# Patient Record
Sex: MALE | Race: BLACK OR AFRICAN AMERICAN | NOT HISPANIC OR LATINO | Employment: FULL TIME | URBAN - METROPOLITAN AREA
[De-identification: names, ages, dates, MRNs, and addresses within clinical notes are randomized per-mention and may not be internally consistent; named-entity substitution may affect disease eponyms.]

---

## 2020-08-12 ENCOUNTER — APPOINTMENT (EMERGENCY)
Dept: RADIOLOGY | Facility: HOSPITAL | Age: 35
End: 2020-08-12

## 2020-08-12 ENCOUNTER — HOSPITAL ENCOUNTER (EMERGENCY)
Facility: HOSPITAL | Age: 35
Discharge: HOME | End: 2020-08-12
Attending: EMERGENCY MEDICINE | Admitting: EMERGENCY MEDICINE

## 2020-08-12 VITALS
RESPIRATION RATE: 18 BRPM | WEIGHT: 280 LBS | DIASTOLIC BLOOD PRESSURE: 85 MMHG | HEART RATE: 102 BPM | OXYGEN SATURATION: 98 % | TEMPERATURE: 99.6 F | SYSTOLIC BLOOD PRESSURE: 163 MMHG

## 2020-08-12 DIAGNOSIS — M25.561 ACUTE PAIN OF RIGHT KNEE: Primary | ICD-10-CM

## 2020-08-12 PROCEDURE — 99282 EMERGENCY DEPT VISIT SF MDM: CPT | Performed by: EMERGENCY MEDICINE

## 2020-08-12 PROCEDURE — 99284 EMERGENCY DEPT VISIT MOD MDM: CPT

## 2020-08-12 PROCEDURE — 73564 X-RAY EXAM KNEE 4 OR MORE: CPT
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
## 2020-08-12 NOTE — DISCHARGE INSTRUCTIONS
Follow-up with orthopedics within 1 week  Wear knee immobilizer and use crutches as needed  Rest, ice, elevate  Return to the emergency department for the following, but not limited to worsening pain, numbness, tingling, significant swelling, redness, fevers

## 2020-08-12 NOTE — ED NOTES
Pt verbally aggressive with staff  Yelling and cursing loudly  Security Chyna as well as 2 RNs at bedside attempting to de-escalate  Pt continues to be verbally aggressive and yelling that we are treating him unfairly due to the fact that he's black  Pt continues to yell at staff  Charge RN, davion Yeh RN  08/12/20 2588

## 2020-08-12 NOTE — ED NOTES
Pt speaking very loudly on the phone stating, he was still going to [facility] with his knee brace and would be walking around to "make it worse "  He also mentioned something to the effect of when he gets his "million dollars "       Nola Reynoso RN  08/12/20 0511

## 2021-04-11 ENCOUNTER — HOSPITAL ENCOUNTER (EMERGENCY)
Facility: HOSPITAL | Age: 36
Discharge: HOME | End: 2021-04-11
Attending: EMERGENCY MEDICINE | Admitting: EMERGENCY MEDICINE
Payer: COMMERCIAL

## 2021-04-11 ENCOUNTER — APPOINTMENT (EMERGENCY)
Dept: RADIOLOGY | Facility: HOSPITAL | Age: 36
End: 2021-04-11
Payer: COMMERCIAL

## 2021-04-11 VITALS
DIASTOLIC BLOOD PRESSURE: 78 MMHG | SYSTOLIC BLOOD PRESSURE: 140 MMHG | RESPIRATION RATE: 22 BRPM | OXYGEN SATURATION: 99 % | HEART RATE: 74 BPM | TEMPERATURE: 97.4 F

## 2021-04-11 DIAGNOSIS — R55 SYNCOPE: Primary | ICD-10-CM

## 2021-04-11 LAB
ANION GAP SERPL CALCULATED.3IONS-SCNC: 8 MMOL/L (ref 4–13)
BASOPHILS # BLD AUTO: 0.05 THOUSANDS/ΜL (ref 0–0.1)
BASOPHILS NFR BLD AUTO: 0 % (ref 0–1)
BUN SERPL-MCNC: 9 MG/DL (ref 5–25)
CALCIUM SERPL-MCNC: 9.1 MG/DL (ref 8.3–10.1)
CHLORIDE SERPL-SCNC: 102 MMOL/L (ref 100–108)
CO2 SERPL-SCNC: 28 MMOL/L (ref 21–32)
CREAT SERPL-MCNC: 1.28 MG/DL (ref 0.6–1.3)
EOSINOPHIL # BLD AUTO: 0.17 THOUSAND/ΜL (ref 0–0.61)
EOSINOPHIL NFR BLD AUTO: 1 % (ref 0–6)
ERYTHROCYTE [DISTWIDTH] IN BLOOD BY AUTOMATED COUNT: 13.7 % (ref 11.6–15.1)
GFR SERPL CREATININE-BSD FRML MDRD: 83 ML/MIN/1.73SQ M
GLUCOSE SERPL-MCNC: 124 MG/DL (ref 65–140)
HCT VFR BLD AUTO: 41.4 % (ref 36.5–49.3)
HGB BLD-MCNC: 12.6 G/DL (ref 12–17)
HOLD SPECIMEN: NORMAL
IMM GRANULOCYTES # BLD AUTO: 0.06 THOUSAND/UL (ref 0–0.2)
IMM GRANULOCYTES NFR BLD AUTO: 1 % (ref 0–2)
LYMPHOCYTES # BLD AUTO: 1.74 THOUSANDS/ΜL (ref 0.6–4.47)
LYMPHOCYTES NFR BLD AUTO: 15 % (ref 14–44)
MCH RBC QN AUTO: 25.9 PG (ref 26.8–34.3)
MCHC RBC AUTO-ENTMCNC: 30.4 G/DL (ref 31.4–37.4)
MCV RBC AUTO: 85 FL (ref 82–98)
MONOCYTES # BLD AUTO: 0.36 THOUSAND/ΜL (ref 0.17–1.22)
MONOCYTES NFR BLD AUTO: 3 % (ref 4–12)
NEUTROPHILS # BLD AUTO: 9.51 THOUSANDS/ΜL (ref 1.85–7.62)
NEUTS SEG NFR BLD AUTO: 80 % (ref 43–75)
NRBC BLD AUTO-RTO: 0 /100 WBCS
PLATELET # BLD AUTO: 157 THOUSANDS/UL (ref 149–390)
PMV BLD AUTO: 11.6 FL (ref 8.9–12.7)
POTASSIUM SERPL-SCNC: 3.5 MMOL/L (ref 3.5–5.3)
RBC # BLD AUTO: 4.87 MILLION/UL (ref 3.88–5.62)
SODIUM SERPL-SCNC: 138 MMOL/L (ref 136–145)
WBC # BLD AUTO: 11.89 THOUSAND/UL (ref 4.31–10.16)

## 2021-04-11 PROCEDURE — G1004 CDSM NDSC: HCPCS

## 2021-04-11 PROCEDURE — 36415 COLL VENOUS BLD VENIPUNCTURE: CPT | Performed by: PHYSICIAN ASSISTANT

## 2021-04-11 PROCEDURE — 70450 CT HEAD/BRAIN W/O DYE: CPT

## 2021-04-11 PROCEDURE — 99285 EMERGENCY DEPT VISIT HI MDM: CPT | Performed by: PHYSICIAN ASSISTANT

## 2021-04-11 PROCEDURE — 93005 ELECTROCARDIOGRAM TRACING: CPT

## 2021-04-11 PROCEDURE — 85025 COMPLETE CBC W/AUTO DIFF WBC: CPT | Performed by: PHYSICIAN ASSISTANT

## 2021-04-11 PROCEDURE — 80048 BASIC METABOLIC PNL TOTAL CA: CPT | Performed by: PHYSICIAN ASSISTANT

## 2021-04-11 PROCEDURE — 99285 EMERGENCY DEPT VISIT HI MDM: CPT

## 2021-04-11 NOTE — ED PROVIDER NOTES
History  Chief Complaint   Patient presents with    Syncope     patient was outside smoking medical marijuana when he felt lightheaded and passed out  Patient states he drank about a 6 pack and a few shots last night and woke up feeling thirsty and dehydrated  Patient currently has no complaints  The patient is a 43yo with single episode of syncope just PTA  He reports drinkin more than normal last night (a six pack and a few shots)  Noted to be smoking his medical marijuana this morning as well  This is prescribed for chronic leg pain  Felt dizzy and passed out  Did not hit his head  LOC was brief  Fells may be related to dehydration  No complaints at this time  Syncope  Associated symptoms: no palpitations, no seizures and no weakness        None       History reviewed  No pertinent past medical history  Past Surgical History:   Procedure Laterality Date    KNEE SURGERY Bilateral     SHOULDER SURGERY Left        History reviewed  No pertinent family history  I have reviewed and agree with the history as documented  E-Cigarette/Vaping    E-Cigarette Use Never User      E-Cigarette/Vaping Substances    Nicotine No     THC No     CBD No     Flavoring No     Other No     Unknown No      Social History     Tobacco Use    Smoking status: Current Some Day Smoker     Types: Cigarettes    Smokeless tobacco: Never Used   Substance Use Topics    Alcohol use: Yes     Frequency: 2-3 times a week    Drug use: Yes     Types: Marijuana     Comment: medical card       Review of Systems   Constitutional: Negative for activity change, appetite change and fatigue  HENT: Negative for nosebleeds, sneezing, sore throat, trouble swallowing and voice change  Eyes: Negative for photophobia, pain and visual disturbance  Respiratory: Negative for apnea, choking and stridor  Cardiovascular: Positive for syncope  Negative for palpitations and leg swelling     Gastrointestinal: Negative for anal bleeding and constipation  Endocrine: Negative for cold intolerance, heat intolerance, polydipsia and polyphagia  Genitourinary: Negative for decreased urine volume, enuresis, frequency, genital sores and urgency  Musculoskeletal: Negative for joint swelling and myalgias  Allergic/Immunologic: Negative for environmental allergies and food allergies  Neurological: Positive for syncope  Negative for tremors, seizures, speech difficulty and weakness  Hematological: Negative for adenopathy  Psychiatric/Behavioral: Negative for behavioral problems, decreased concentration, dysphoric mood and hallucinations  All other systems reviewed and are negative  Physical Exam  Physical Exam  Vitals signs reviewed  Constitutional:       General: He is not in acute distress  Appearance: He is well-developed  He is not diaphoretic  HENT:      Head: Normocephalic and atraumatic  Right Ear: External ear normal       Left Ear: External ear normal       Nose: Nose normal    Eyes:      Conjunctiva/sclera: Conjunctivae normal       Pupils: Pupils are equal, round, and reactive to light  Neck:      Musculoskeletal: Normal range of motion and neck supple  Cardiovascular:      Rate and Rhythm: Normal rate and regular rhythm  Heart sounds: Normal heart sounds  No murmur  No friction rub  No gallop  Pulmonary:      Effort: Pulmonary effort is normal  No respiratory distress  Breath sounds: Normal breath sounds  No wheezing  Abdominal:      General: Bowel sounds are normal       Palpations: Abdomen is soft  Musculoskeletal:         General: No tenderness  Skin:     General: Skin is warm and dry  Neurological:      Mental Status: He is alert and oriented to person, place, and time     Psychiatric:         Behavior: Behavior normal          Vital Signs  ED Triage Vitals [04/11/21 1229]   Temperature Pulse Respirations Blood Pressure SpO2   (!) 97 4 °F (36 3 °C) 94 18 163/81 100 %      Temp Source Heart Rate Source Patient Position - Orthostatic VS BP Location FiO2 (%)   Tympanic Monitor Sitting Right arm --      Pain Score       --           Vitals:    04/11/21 1430 04/11/21 1500 04/11/21 1515 04/11/21 1530   BP: 140/78      Pulse:  84 78 74   Patient Position - Orthostatic VS:             Visual Acuity  Visual Acuity      Most Recent Value   L Pupil Size (mm)  2   R Pupil Size (mm)  2          ED Medications  Medications - No data to display    Diagnostic Studies  Results Reviewed     Procedure Component Value Units Date/Time    Bronson draw [[id number]] Collected: 04/11/21 1245    Lab Status: Final result Specimen: Blood from Arm, Left Updated: 04/11/21 1401    Narrative: The following orders were created for panel order Bronson draw  Procedure                               Abnormality         Status                     ---------                               -----------         ------                     Simran Conger Top on IVBM[[id number]]                           Final result               Green / Black tube on XLZH[[id number]]                       Final result                 Please view results for these tests on the individual orders      Basic metabolic panel [[id number]] Collected: 04/11/21 1245    Lab Status: Final result Specimen: Blood from Arm, Left Updated: 04/11/21 1315     Sodium 138 mmol/L      Potassium 3 5 mmol/L      Chloride 102 mmol/L      CO2 28 mmol/L      ANION GAP 8 mmol/L      BUN 9 mg/dL      Creatinine 1 28 mg/dL      Glucose 124 mg/dL      Calcium 9 1 mg/dL      eGFR 83 ml/min/1 73sq m     Narrative:      Alex guidelines for Chronic Kidney Disease (CKD):     Stage 1 with normal or high GFR (GFR > 90 mL/min/1 73 square meters)    Stage 2 Mild CKD (GFR = 60-89 mL/min/1 73 square meters)    Stage 3A Moderate CKD (GFR = 45-59 mL/min/1 73 square meters)    Stage 3B Moderate CKD (GFR = 30-44 mL/min/1 73 square meters)    Stage 4 Severe CKD (GFR = 15-29 mL/min/1 73 square meters)    Stage 5 End Stage CKD (GFR <15 mL/min/1 73 square meters)  Note: GFR calculation is accurate only with a steady state creatinine    CBC and differential [[id number]]  (Abnormal) Collected: 04/11/21 1245    Lab Status: Final result Specimen: Blood from Arm, Left Updated: 04/11/21 1302     WBC 11 89 Thousand/uL      RBC 4 87 Million/uL      Hemoglobin 12 6 g/dL      Hematocrit 41 4 %      MCV 85 fL      MCH 25 9 pg      MCHC 30 4 g/dL      RDW 13 7 %      MPV 11 6 fL      Platelets 663 Thousands/uL      nRBC 0 /100 WBCs      Neutrophils Relative 80 %      Immat GRANS % 1 %      Lymphocytes Relative 15 %      Monocytes Relative 3 %      Eosinophils Relative 1 %      Basophils Relative 0 %      Neutrophils Absolute 9 51 Thousands/µL      Immature Grans Absolute 0 06 Thousand/uL      Lymphocytes Absolute 1 74 Thousands/µL      Monocytes Absolute 0 36 Thousand/µL      Eosinophils Absolute 0 17 Thousand/µL      Basophils Absolute 0 05 Thousands/µL                  CT head without contrast   Final Result by Addison Vasquez MD (04/11 1515)      No acute intracranial abnormality  Workstation performed: [id number]                    Procedures  ECG 12 Lead Documentation Only    Date/Time: 4/11/2021 12:53 PM  Performed by: Eldon Silveira PA-C  Authorized by: Eldon Silveira PA-C     Indications / Diagnosis:  Syncope  Patient location:  ED  Previous ECG:     Previous ECG:  Unavailable  Interpretation:     Interpretation: normal    Rate:     ECG rate:  90    ECG rate assessment: normal    Rhythm:     Rhythm: sinus rhythm    QRS:     QRS axis:  Normal  ST segments:     ST segments:  Normal  T waves:     T waves: normal               ED Course                             SBIRT 22yo+      Most Recent Value   SBIRT (24 yo +)   In order to provide better care to our patients, we are screening all of our patients for alcohol and drug use   Would it be okay to ask you these screening questions? Yes Filed at: 04/11/2021 1304   Initial Alcohol Screen: US AUDIT-C    1  How often do you have a drink containing alcohol? 3 Filed at: 04/11/2021 1304   2  How many drinks containing alcohol do you have on a typical day you are drinking? 4 Filed at: 04/11/2021 1304   3a  Male UNDER 65: How often do you have five or more drinks on one occasion? 3 Filed at: 04/11/2021 1304   3b  FEMALE Any Age, or MALE 65+: How often do you have 4 or more drinks on one occassion? 0 Filed at: 04/11/2021 1304   Audit-C Score  (!) 10 Filed at: 04/11/2021 1304   Full Alcohol Screen: US AUDIT   4  How often during the last year have you found that you were not able to stop drinking once you had started? 0 Filed at: 04/11/2021 1304   5  How often during past year have you failed to do what was normally expected of you because of drinking? 0 Filed at: 04/11/2021 1304   6  How often in past year have you needed a first drink in the morning to get yourself going after a heavy drinking session? 0 Filed at: 04/11/2021 1304   7  How often in past year have you had feeling of guilt or remorse after drinking? 0 Filed at: 04/11/2021 1304   8  How often in past year have you been unable to remember what happened night before because you had been drinking? 0 Filed at: 04/11/2021 1304   9  Have you or someone else been injured as a result of your drinking? 0 Filed at: 04/11/2021 1304   10  Has a relative, friend, doctor or other health worker been concerned about your drinking and suggested you cut down?   0 Filed at: 04/11/2021 1304   AUDIT Total Score  10 Filed at: 04/11/2021 1304   GRACIE: How many times in the past year have you    Used an illegal drug or used a prescription medication for non-medical reasons?   Never Filed at: 04/11/2021 1304                    MDM    Disposition  Final diagnoses:   Syncope     Time reflects when diagnosis was documented in both MDM as applicable and the Disposition within this note Time User Action Codes Description Comment    4/11/2021  1:29 PM Paco Liu [R55] Syncope       ED Disposition     ED Disposition Condition Date/Time Comment    Discharge Stable Sun Apr 11, 2021  3:28 PM Jaye Ross discharge to home/self care  Follow-up Information     Follow up With Specialties Details Why [address]   Schedule an appointment as soon as possible for a visit   [address] [phone]          There are no discharge medications for this patient  No discharge procedures on file      PDMP Review     None          ED Provider  Electronically Signed by           Rachael Serna PA-C  04/11/21 5589

## 2021-04-11 NOTE — ED NOTES
IV drip not flowing  Site flushed and pt advised to keep arm still while IV fluids are running       Archana Rosales RN  04/11/21 5018
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
## 2021-04-12 LAB
ATRIAL RATE: 90 BPM
P AXIS: 60 DEGREES
PR INTERVAL: 132 MS
QRS AXIS: 32 DEGREES
QRSD INTERVAL: 86 MS
QT INTERVAL: 348 MS
QTC INTERVAL: 425 MS
T WAVE AXIS: -12 DEGREES
VENTRICULAR RATE: 90 BPM

## 2021-04-12 PROCEDURE — 93010 ELECTROCARDIOGRAM REPORT: CPT | Performed by: INTERNAL MEDICINE

## 2021-04-19 ENCOUNTER — HOSPITAL ENCOUNTER (EMERGENCY)
Facility: HOSPITAL | Age: 36
Discharge: HOME | End: 2021-04-19
Attending: EMERGENCY MEDICINE
Payer: COMMERCIAL

## 2021-04-19 VITALS
OXYGEN SATURATION: 98 % | WEIGHT: 271.2 LBS | SYSTOLIC BLOOD PRESSURE: 116 MMHG | HEART RATE: 106 BPM | TEMPERATURE: 100.6 F | DIASTOLIC BLOOD PRESSURE: 72 MMHG | RESPIRATION RATE: 20 BRPM

## 2021-04-19 DIAGNOSIS — B34.9 VIRAL ILLNESS: Primary | ICD-10-CM

## 2021-04-19 PROCEDURE — U0005 INFEC AGEN DETEC AMPLI PROBE: HCPCS | Performed by: EMERGENCY MEDICINE

## 2021-04-19 PROCEDURE — 99284 EMERGENCY DEPT VISIT MOD MDM: CPT | Performed by: EMERGENCY MEDICINE

## 2021-04-19 PROCEDURE — U0003 INFECTIOUS AGENT DETECTION BY NUCLEIC ACID (DNA OR RNA); SEVERE ACUTE RESPIRATORY SYNDROME CORONAVIRUS 2 (SARS-COV-2) (CORONAVIRUS DISEASE [COVID-19]), AMPLIFIED PROBE TECHNIQUE, MAKING USE OF HIGH THROUGHPUT TECHNOLOGIES AS DESCRIBED BY CMS-2020-01-R: HCPCS | Performed by: EMERGENCY MEDICINE

## 2021-04-19 PROCEDURE — 99283 EMERGENCY DEPT VISIT LOW MDM: CPT

## 2021-04-19 RX ORDER — ACETAMINOPHEN 325 MG/1
650 TABLET ORAL ONCE
Status: COMPLETED | OUTPATIENT
Start: 2021-04-19 | End: 2021-04-19

## 2021-04-19 RX ADMIN — ACETAMINOPHEN 650 MG: 325 TABLET, FILM COATED ORAL at 20:36

## 2021-04-20 LAB — SARS-COV-2 RNA RESP QL NAA+PROBE: NEGATIVE

## 2021-04-20 NOTE — ED PROVIDER NOTES
History  Chief Complaint   Patient presents with    Fever - 9 weeks to 74 years     States he had gotten first covid vaccine last week  States not feeling well since vaccine, headache chills, bodyaches  Started with fever today  39 yo male c/o getting covid shot 6 days and started to feel ill a hours later  Developed body aches and then chills and slight cough  Today took temperature and found to have fever  No vomiting or diarrhea  History provided by:  Patient   used: No    Fever - 9 weeks to 74 years  Associated symptoms: chills, cough, myalgias and rash    Associated symptoms: no diarrhea and no vomiting        None       History reviewed  No pertinent past medical history  Past Surgical History:   Procedure Laterality Date    KNEE SURGERY Bilateral     SHOULDER SURGERY Left        History reviewed  No pertinent family history  I have reviewed and agree with the history as documented  E-Cigarette/Vaping    E-Cigarette Use Never User      E-Cigarette/Vaping Substances    Nicotine No     THC No     CBD No     Flavoring No     Other No     Unknown No      Social History     Tobacco Use    Smoking status: Current Some Day Smoker     Types: Cigarettes    Smokeless tobacco: Never Used   Substance Use Topics    Alcohol use: Yes     Frequency: 2-3 times a week    Drug use: Yes     Types: Marijuana     Comment: medical card       Review of Systems   Constitutional: Positive for chills and fever  Respiratory: Positive for cough  Gastrointestinal: Negative for diarrhea and vomiting  Musculoskeletal: Positive for myalgias  Skin: Positive for rash  Physical Exam  Physical Exam  Vitals signs and nursing note reviewed  Constitutional:       General: He is not in acute distress  Appearance: He is not ill-appearing, toxic-appearing or diaphoretic  HENT:      Head: Normocephalic and atraumatic  Neck:      Musculoskeletal: Neck supple     Cardiovascular: Rate and Rhythm: Normal rate and regular rhythm  Pulmonary:      Effort: Pulmonary effort is normal       Breath sounds: Normal breath sounds  Abdominal:      Palpations: Abdomen is soft  Musculoskeletal: Normal range of motion  Lymphadenopathy:      Cervical: No cervical adenopathy  Skin:     General: Skin is warm and dry  Neurological:      General: No focal deficit present  Mental Status: He is alert and oriented to person, place, and time  Psychiatric:         Mood and Affect: Mood normal          Behavior: Behavior normal          Vital Signs  ED Triage Vitals [04/19/21 1925]   Temperature Pulse Respirations Blood Pressure SpO2   (!) 100 6 °F (38 1 °C) (!) 106 20 116/72 98 %      Temp Source Heart Rate Source Patient Position - Orthostatic VS BP Location FiO2 (%)   Tympanic Monitor Sitting Left arm --      Pain Score       8           Vitals:    04/19/21 1925   BP: 116/72   Pulse: (!) 106   Patient Position - Orthostatic VS: Sitting         Visual Acuity      ED Medications  Medications   acetaminophen (TYLENOL) tablet 650 mg (650 mg Oral Given 4/19/21 2036)       Diagnostic Studies  Results Reviewed     Procedure Component Value Units Date/Time    [facility] [[id number] Collected: 04/19/21 2036    Lab Status: In process Specimen: Nares from Nose Updated: 04/19/21 2043                 No orders to display              Procedures  Procedures         ED Course                             SBIRT 20yo+      Most Recent Value   SBIRT (25 yo +)   In order to provide better care to our patients, we are screening all of our patients for alcohol and drug use  Would it be okay to ask you these screening questions? No Filed at: 04/19/2021 2043                    MDM  Number of Diagnoses or Management Options  Viral illness:   Diagnosis management comments: Pt   Requesting rapid covid test   I explained that we reserve the rapid tests for people who are very sick and/or being admitted or are having a procedure or surgery  I advised we would have his results tomorrow  Pt  Then demanded that I do a rapid test and I apologized and again explained the criteria  Pt  Andi Salinas he was going to report us to the [facility] and seemed angry and annoyed  Disposition  Final diagnoses:   Viral illness     Time reflects when diagnosis was documented in both MDM as applicable and the Disposition within this note     Time User Action Codes Description Comment    9/92/7689  3:37 PM Fabienne Liu [U41 3] Viral illness       ED Disposition     ED Disposition Condition Date/Time Comment    Discharge Stable Mon Apr 19, 2021  8:04 PM Lisa Kinsey discharge to home/self care  Follow-up Information     Follow up With Specialties Details Why Contact Info    your doctor              There are no discharge medications for this patient  No discharge procedures on file      PDMP Review     None          ED Provider  Electronically Signed by           Viktor Rivera MD  19/60/71 0985

## 2021-04-20 NOTE — DISCHARGE INSTRUCTIONS
Rest, fluids, over the counter tylenol, cough/cold meds as needed  Isolate  Take Vitamin D, C, multivitamin  We will call you with results tomorrow

## 2021-04-23 ENCOUNTER — APPOINTMENT (EMERGENCY)
Dept: RADIOLOGY | Facility: HOSPITAL | Age: 36
End: 2021-04-23
Payer: COMMERCIAL

## 2021-04-23 ENCOUNTER — HOSPITAL ENCOUNTER (EMERGENCY)
Facility: HOSPITAL | Age: 36
Discharge: ANOTHER HOSPITAL | End: 2021-04-24
Attending: EMERGENCY MEDICINE | Admitting: EMERGENCY MEDICINE
Payer: COMMERCIAL

## 2021-04-23 ENCOUNTER — APPOINTMENT (EMERGENCY)
Dept: RADIOLOGY | Facility: HOSPITAL | Age: 36
End: 2021-04-23
Attending: EMERGENCY MEDICINE
Payer: COMMERCIAL

## 2021-04-23 DIAGNOSIS — M79.605 LEFT LEG PAIN: ICD-10-CM

## 2021-04-23 DIAGNOSIS — I82.409 DVT (DEEP VENOUS THROMBOSIS) (HCC): Primary | ICD-10-CM

## 2021-04-23 DIAGNOSIS — R06.02 SOB (SHORTNESS OF BREATH): ICD-10-CM

## 2021-04-23 LAB
ALBUMIN SERPL BCP-MCNC: 3.3 G/DL (ref 3.5–5)
ALP SERPL-CCNC: 89 U/L (ref 46–116)
ALT SERPL W P-5'-P-CCNC: 44 U/L (ref 12–78)
ANION GAP SERPL CALCULATED.3IONS-SCNC: 9 MMOL/L (ref 4–13)
APTT PPP: 28 SECONDS (ref 23–37)
AST SERPL W P-5'-P-CCNC: 17 U/L (ref 5–45)
BACTERIA UR QL AUTO: ABNORMAL /HPF
BASOPHILS # BLD AUTO: 0.07 THOUSANDS/ΜL (ref 0–0.1)
BASOPHILS NFR BLD AUTO: 1 % (ref 0–1)
BILIRUB SERPL-MCNC: 0.4 MG/DL (ref 0.2–1)
BILIRUB UR QL STRIP: ABNORMAL
BUN SERPL-MCNC: 10 MG/DL (ref 5–25)
CALCIUM ALBUM COR SERPL-MCNC: 9.7 MG/DL (ref 8.3–10.1)
CALCIUM SERPL-MCNC: 9.1 MG/DL (ref 8.3–10.1)
CHLORIDE SERPL-SCNC: 102 MMOL/L (ref 100–108)
CLARITY UR: CLEAR
CO2 SERPL-SCNC: 28 MMOL/L (ref 21–32)
COLOR UR: ABNORMAL
CREAT SERPL-MCNC: 1.19 MG/DL (ref 0.6–1.3)
EOSINOPHIL # BLD AUTO: 0.07 THOUSAND/ΜL (ref 0–0.61)
EOSINOPHIL NFR BLD AUTO: 1 % (ref 0–6)
ERYTHROCYTE [DISTWIDTH] IN BLOOD BY AUTOMATED COUNT: 13.3 % (ref 11.6–15.1)
GFR SERPL CREATININE-BSD FRML MDRD: 90 ML/MIN/1.73SQ M
GLUCOSE SERPL-MCNC: 95 MG/DL (ref 65–140)
GLUCOSE UR STRIP-MCNC: NEGATIVE MG/DL
HCT VFR BLD AUTO: 36.2 % (ref 36.5–49.3)
HGB BLD-MCNC: 11 G/DL (ref 12–17)
HGB UR QL STRIP.AUTO: NEGATIVE
IMM GRANULOCYTES # BLD AUTO: 0.07 THOUSAND/UL (ref 0–0.2)
IMM GRANULOCYTES NFR BLD AUTO: 1 % (ref 0–2)
INR PPP: 1.23 (ref 0.84–1.19)
KETONES UR STRIP-MCNC: ABNORMAL MG/DL
LEUKOCYTE ESTERASE UR QL STRIP: NEGATIVE
LYMPHOCYTES # BLD AUTO: 1.48 THOUSANDS/ΜL (ref 0.6–4.47)
LYMPHOCYTES NFR BLD AUTO: 12 % (ref 14–44)
MCH RBC QN AUTO: 25.3 PG (ref 26.8–34.3)
MCHC RBC AUTO-ENTMCNC: 30.4 G/DL (ref 31.4–37.4)
MCV RBC AUTO: 83 FL (ref 82–98)
MONOCYTES # BLD AUTO: 0.93 THOUSAND/ΜL (ref 0.17–1.22)
MONOCYTES NFR BLD AUTO: 8 % (ref 4–12)
MUCOUS THREADS UR QL AUTO: ABNORMAL
NEUTROPHILS # BLD AUTO: 9.52 THOUSANDS/ΜL (ref 1.85–7.62)
NEUTS SEG NFR BLD AUTO: 77 % (ref 43–75)
NITRITE UR QL STRIP: NEGATIVE
NON-SQ EPI CELLS URNS QL MICRO: ABNORMAL /HPF
NRBC BLD AUTO-RTO: 0 /100 WBCS
OTHER STN SPEC: ABNORMAL
PH UR STRIP.AUTO: 5 [PH]
PLATELET # BLD AUTO: 306 THOUSANDS/UL (ref 149–390)
PMV BLD AUTO: 11 FL (ref 8.9–12.7)
POTASSIUM SERPL-SCNC: 3.8 MMOL/L (ref 3.5–5.3)
PROT SERPL-MCNC: 8.8 G/DL (ref 6.4–8.2)
PROT UR STRIP-MCNC: ABNORMAL MG/DL
PROTHROMBIN TIME: 15.3 SECONDS (ref 11.6–14.5)
RBC # BLD AUTO: 4.35 MILLION/UL (ref 3.88–5.62)
RBC #/AREA URNS AUTO: ABNORMAL /HPF
SARS-COV-2 RNA RESP QL NAA+PROBE: NEGATIVE
SODIUM SERPL-SCNC: 139 MMOL/L (ref 136–145)
SP GR UR STRIP.AUTO: <=1.005 (ref 1–1.03)
TROPONIN I SERPL-MCNC: <0.02 NG/ML
UROBILINOGEN UR QL STRIP.AUTO: 1 E.U./DL
WBC # BLD AUTO: 12.14 THOUSAND/UL (ref 4.31–10.16)
WBC #/AREA URNS AUTO: ABNORMAL /HPF

## 2021-04-23 PROCEDURE — U0005 INFEC AGEN DETEC AMPLI PROBE: HCPCS | Performed by: EMERGENCY MEDICINE

## 2021-04-23 PROCEDURE — 71045 X-RAY EXAM CHEST 1 VIEW: CPT

## 2021-04-23 PROCEDURE — 71275 CT ANGIOGRAPHY CHEST: CPT

## 2021-04-23 PROCEDURE — 81001 URINALYSIS AUTO W/SCOPE: CPT | Performed by: EMERGENCY MEDICINE

## 2021-04-23 PROCEDURE — 85730 THROMBOPLASTIN TIME PARTIAL: CPT | Performed by: EMERGENCY MEDICINE

## 2021-04-23 PROCEDURE — 99285 EMERGENCY DEPT VISIT HI MDM: CPT | Performed by: EMERGENCY MEDICINE

## 2021-04-23 PROCEDURE — 74174 CTA ABD&PLVS W/CONTRAST: CPT

## 2021-04-23 PROCEDURE — 93005 ELECTROCARDIOGRAM TRACING: CPT

## 2021-04-23 PROCEDURE — 84484 ASSAY OF TROPONIN QUANT: CPT | Performed by: EMERGENCY MEDICINE

## 2021-04-23 PROCEDURE — 85025 COMPLETE CBC W/AUTO DIFF WBC: CPT | Performed by: EMERGENCY MEDICINE

## 2021-04-23 PROCEDURE — 80053 COMPREHEN METABOLIC PANEL: CPT | Performed by: EMERGENCY MEDICINE

## 2021-04-23 PROCEDURE — 99285 EMERGENCY DEPT VISIT HI MDM: CPT

## 2021-04-23 PROCEDURE — 85610 PROTHROMBIN TIME: CPT | Performed by: EMERGENCY MEDICINE

## 2021-04-23 PROCEDURE — 96361 HYDRATE IV INFUSION ADD-ON: CPT

## 2021-04-23 PROCEDURE — 96375 TX/PRO/DX INJ NEW DRUG ADDON: CPT

## 2021-04-23 PROCEDURE — U0003 INFECTIOUS AGENT DETECTION BY NUCLEIC ACID (DNA OR RNA); SEVERE ACUTE RESPIRATORY SYNDROME CORONAVIRUS 2 (SARS-COV-2) (CORONAVIRUS DISEASE [COVID-19]), AMPLIFIED PROBE TECHNIQUE, MAKING USE OF HIGH THROUGHPUT TECHNOLOGIES AS DESCRIBED BY CMS-2020-01-R: HCPCS | Performed by: EMERGENCY MEDICINE

## 2021-04-23 PROCEDURE — 87086 URINE CULTURE/COLONY COUNT: CPT | Performed by: EMERGENCY MEDICINE

## 2021-04-23 PROCEDURE — 36415 COLL VENOUS BLD VENIPUNCTURE: CPT | Performed by: EMERGENCY MEDICINE

## 2021-04-23 RX ORDER — HYDROMORPHONE HCL/PF 1 MG/ML
1 SYRINGE (ML) INJECTION ONCE
Status: COMPLETED | OUTPATIENT
Start: 2021-04-23 | End: 2021-04-23

## 2021-04-23 RX ORDER — ACETAMINOPHEN 325 MG/1
975 TABLET ORAL ONCE
Status: COMPLETED | OUTPATIENT
Start: 2021-04-23 | End: 2021-04-23

## 2021-04-23 RX ADMIN — IOHEXOL 100 ML: 350 INJECTION, SOLUTION INTRAVENOUS at 21:20

## 2021-04-23 RX ADMIN — SODIUM CHLORIDE 1000 ML: 0.9 INJECTION, SOLUTION INTRAVENOUS at 20:18

## 2021-04-23 RX ADMIN — ACETAMINOPHEN 975 MG: 325 TABLET, FILM COATED ORAL at 20:26

## 2021-04-23 RX ADMIN — HYDROMORPHONE HYDROCHLORIDE 1 MG: 1 INJECTION, SOLUTION INTRAMUSCULAR; INTRAVENOUS; SUBCUTANEOUS at 20:27
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
## 2021-04-24 ENCOUNTER — APPOINTMENT (INPATIENT)
Dept: NON INVASIVE DIAGNOSTICS | Facility: HOSPITAL | Age: 36
DRG: 271 | End: 2021-04-24
Payer: COMMERCIAL

## 2021-04-24 ENCOUNTER — HOSPITAL ENCOUNTER (INPATIENT)
Facility: HOSPITAL | Age: 36
LOS: 6 days | Discharge: HOME | DRG: 271 | End: 2021-04-30
Attending: INTERNAL MEDICINE | Admitting: INTERNAL MEDICINE
Payer: COMMERCIAL

## 2021-04-24 VITALS
OXYGEN SATURATION: 96 % | HEART RATE: 80 BPM | TEMPERATURE: 100 F | RESPIRATION RATE: 24 BRPM | DIASTOLIC BLOOD PRESSURE: 66 MMHG | WEIGHT: 264 LBS | SYSTOLIC BLOOD PRESSURE: 112 MMHG

## 2021-04-24 DIAGNOSIS — I82.90 VENOUS THROMBOSIS: ICD-10-CM

## 2021-04-24 DIAGNOSIS — D64.9 ANEMIA: ICD-10-CM

## 2021-04-24 DIAGNOSIS — I82.402 ACUTE DEEP VEIN THROMBOSIS (DVT) OF LEFT LOWER EXTREMITY, UNSPECIFIED VEIN (HCC): Primary | ICD-10-CM

## 2021-04-24 DIAGNOSIS — R07.9 CHEST PAIN: ICD-10-CM

## 2021-04-24 PROBLEM — Z90.49 HISTORY OF PARTIAL COLECTOMY: Status: ACTIVE | Noted: 2021-04-24

## 2021-04-24 PROBLEM — N39.0 UTI (URINARY TRACT INFECTION): Status: ACTIVE | Noted: 2021-04-24

## 2021-04-24 PROBLEM — Z87.828 HISTORY OF MOTOR VEHICLE ACCIDENT: Status: ACTIVE | Noted: 2021-04-24

## 2021-04-24 LAB
ANION GAP SERPL CALCULATED.3IONS-SCNC: 5 MMOL/L (ref 4–13)
APTT PPP: 41 SECONDS (ref 23–37)
APTT PPP: 76 SECONDS (ref 23–37)
APTT PPP: 87 SECONDS (ref 23–37)
ATRIAL RATE: 101 BPM
BUN SERPL-MCNC: 9 MG/DL (ref 5–25)
CALCIUM SERPL-MCNC: 9.3 MG/DL (ref 8.3–10.1)
CHLORIDE SERPL-SCNC: 109 MMOL/L (ref 100–108)
CO2 SERPL-SCNC: 26 MMOL/L (ref 21–32)
CREAT SERPL-MCNC: 1.08 MG/DL (ref 0.6–1.3)
ERYTHROCYTE [DISTWIDTH] IN BLOOD BY AUTOMATED COUNT: 13.6 % (ref 11.6–15.1)
FERRITIN SERPL-MCNC: 239 NG/ML (ref 8–388)
GFR SERPL CREATININE-BSD FRML MDRD: 102 ML/MIN/1.73SQ M
GLUCOSE SERPL-MCNC: 88 MG/DL (ref 65–140)
HCT VFR BLD AUTO: 30.7 % (ref 36.5–49.3)
HGB BLD-MCNC: 9.5 G/DL (ref 12–17)
IRON SATN MFR SERPL: 9 %
IRON SERPL-MCNC: 20 UG/DL (ref 65–175)
MCH RBC QN AUTO: 26 PG (ref 26.8–34.3)
MCHC RBC AUTO-ENTMCNC: 30.9 G/DL (ref 31.4–37.4)
MCV RBC AUTO: 84 FL (ref 82–98)
P AXIS: 54 DEGREES
PLATELET # BLD AUTO: 272 THOUSANDS/UL (ref 149–390)
PMV BLD AUTO: 10.8 FL (ref 8.9–12.7)
POTASSIUM SERPL-SCNC: 3.7 MMOL/L (ref 3.5–5.3)
PR INTERVAL: 122 MS
QRS AXIS: 32 DEGREES
QRSD INTERVAL: 76 MS
QT INTERVAL: 310 MS
QTC INTERVAL: 401 MS
RBC # BLD AUTO: 3.66 MILLION/UL (ref 3.88–5.62)
SODIUM SERPL-SCNC: 140 MMOL/L (ref 136–145)
T WAVE AXIS: 1 DEGREES
TIBC SERPL-MCNC: 235 UG/DL (ref 250–450)
VENTRICULAR RATE: 101 BPM
WBC # BLD AUTO: 11.57 THOUSAND/UL (ref 4.31–10.16)

## 2021-04-24 PROCEDURE — 06HM33Z INSERTION OF INFUSION DEVICE INTO RIGHT FEMORAL VEIN, PERCUTANEOUS APPROACH: ICD-10-PCS | Performed by: RADIOLOGY

## 2021-04-24 PROCEDURE — 82728 ASSAY OF FERRITIN: CPT | Performed by: INTERNAL MEDICINE

## 2021-04-24 PROCEDURE — 85027 COMPLETE CBC AUTOMATED: CPT | Performed by: INTERNAL MEDICINE

## 2021-04-24 PROCEDURE — 85730 THROMBOPLASTIN TIME PARTIAL: CPT | Performed by: INTERNAL MEDICINE

## 2021-04-24 PROCEDURE — 99221 1ST HOSP IP/OBS SF/LOW 40: CPT | Performed by: INTERNAL MEDICINE

## 2021-04-24 PROCEDURE — 06CG3ZZ EXTIRPATION OF MATTER FROM LEFT EXTERNAL ILIAC VEIN, PERCUTANEOUS APPROACH: ICD-10-PCS | Performed by: RADIOLOGY

## 2021-04-24 PROCEDURE — B51C1ZZ FLUOROSCOPY OF LEFT LOWER EXTREMITY VEINS USING LOW OSMOLAR CONTRAST: ICD-10-PCS | Performed by: RADIOLOGY

## 2021-04-24 PROCEDURE — 06CN3ZZ EXTIRPATION OF MATTER FROM LEFT FEMORAL VEIN, PERCUTANEOUS APPROACH: ICD-10-PCS | Performed by: RADIOLOGY

## 2021-04-24 PROCEDURE — B5191ZZ FLUOROSCOPY OF INFERIOR VENA CAVA USING LOW OSMOLAR CONTRAST: ICD-10-PCS | Performed by: RADIOLOGY

## 2021-04-24 PROCEDURE — 93978 VASCULAR STUDY: CPT | Performed by: SURGERY

## 2021-04-24 PROCEDURE — 3E03317 INTRODUCTION OF OTHER THROMBOLYTIC INTO PERIPHERAL VEIN, PERCUTANEOUS APPROACH: ICD-10-PCS | Performed by: RADIOLOGY

## 2021-04-24 PROCEDURE — 96365 THER/PROPH/DIAG IV INF INIT: CPT

## 2021-04-24 PROCEDURE — 96376 TX/PRO/DX INJ SAME DRUG ADON: CPT

## 2021-04-24 PROCEDURE — 06HN33Z INSERTION OF INFUSION DEVICE INTO LEFT FEMORAL VEIN, PERCUTANEOUS APPROACH: ICD-10-PCS | Performed by: RADIOLOGY

## 2021-04-24 PROCEDURE — NC001 PR NO CHARGE: Performed by: SURGERY

## 2021-04-24 PROCEDURE — 99223 1ST HOSP IP/OBS HIGH 75: CPT | Performed by: SURGERY

## 2021-04-24 PROCEDURE — 80048 BASIC METABOLIC PNL TOTAL CA: CPT | Performed by: INTERNAL MEDICINE

## 2021-04-24 PROCEDURE — 06CD3ZZ EXTIRPATION OF MATTER FROM LEFT COMMON ILIAC VEIN, PERCUTANEOUS APPROACH: ICD-10-PCS | Performed by: RADIOLOGY

## 2021-04-24 PROCEDURE — 93970 EXTREMITY STUDY: CPT

## 2021-04-24 PROCEDURE — 83550 IRON BINDING TEST: CPT | Performed by: INTERNAL MEDICINE

## 2021-04-24 PROCEDURE — 96372 THER/PROPH/DIAG INJ SC/IM: CPT

## 2021-04-24 PROCEDURE — 83540 ASSAY OF IRON: CPT | Performed by: INTERNAL MEDICINE

## 2021-04-24 PROCEDURE — NC001 PR NO CHARGE: Performed by: RADIOLOGY

## 2021-04-24 PROCEDURE — 06C03ZZ EXTIRPATION OF MATTER FROM INFERIOR VENA CAVA, PERCUTANEOUS APPROACH: ICD-10-PCS | Performed by: RADIOLOGY

## 2021-04-24 PROCEDURE — 93010 ELECTROCARDIOGRAM REPORT: CPT | Performed by: INTERNAL MEDICINE

## 2021-04-24 PROCEDURE — 93970 EXTREMITY STUDY: CPT | Performed by: SURGERY

## 2021-04-24 PROCEDURE — 93978 VASCULAR STUDY: CPT

## 2021-04-24 PROCEDURE — 06CY3ZZ EXTIRPATION OF MATTER FROM LOWER VEIN, PERCUTANEOUS APPROACH: ICD-10-PCS | Performed by: RADIOLOGY

## 2021-04-24 RX ORDER — HYDROMORPHONE HCL IN WATER/PF 6 MG/30 ML
0.2 PATIENT CONTROLLED ANALGESIA SYRINGE INTRAVENOUS EVERY 6 HOURS PRN
Status: DISCONTINUED | OUTPATIENT
Start: 2021-04-24 | End: 2021-04-28

## 2021-04-24 RX ORDER — OXYCODONE HYDROCHLORIDE 5 MG/1
2.5 TABLET ORAL EVERY 6 HOURS PRN
Status: DISCONTINUED | OUTPATIENT
Start: 2021-04-24 | End: 2021-04-30 | Stop reason: HOSPADM

## 2021-04-24 RX ORDER — HEPARIN SODIUM 10000 [USP'U]/100ML
3-30 INJECTION, SOLUTION INTRAVENOUS
Status: DISCONTINUED | OUTPATIENT
Start: 2021-04-24 | End: 2021-04-26

## 2021-04-24 RX ORDER — ACETAMINOPHEN 325 MG/1
650 TABLET ORAL EVERY 6 HOURS PRN
Status: DISCONTINUED | OUTPATIENT
Start: 2021-04-24 | End: 2021-04-24

## 2021-04-24 RX ORDER — OXYCODONE HYDROCHLORIDE 5 MG/1
5 TABLET ORAL EVERY 6 HOURS PRN
Status: DISCONTINUED | OUTPATIENT
Start: 2021-04-24 | End: 2021-04-30 | Stop reason: HOSPADM

## 2021-04-24 RX ORDER — ACETAMINOPHEN 325 MG/1
650 TABLET ORAL EVERY 6 HOURS PRN
Status: DISCONTINUED | OUTPATIENT
Start: 2021-04-24 | End: 2021-04-30 | Stop reason: HOSPADM

## 2021-04-24 RX ORDER — HYDROMORPHONE HCL/PF 1 MG/ML
1 SYRINGE (ML) INJECTION ONCE
Status: COMPLETED | OUTPATIENT
Start: 2021-04-24 | End: 2021-04-24

## 2021-04-24 RX ORDER — CEFTRIAXONE 1 G/50ML
1000 INJECTION, SOLUTION INTRAVENOUS ONCE
Status: COMPLETED | OUTPATIENT
Start: 2021-04-24 | End: 2021-04-24

## 2021-04-24 RX ORDER — HEPARIN SODIUM 1000 [USP'U]/ML
4800 INJECTION, SOLUTION INTRAVENOUS; SUBCUTANEOUS
Status: DISCONTINUED | OUTPATIENT
Start: 2021-04-24 | End: 2021-04-26

## 2021-04-24 RX ORDER — HEPARIN SODIUM 1000 [USP'U]/ML
9600 INJECTION, SOLUTION INTRAVENOUS; SUBCUTANEOUS
Status: DISCONTINUED | OUTPATIENT
Start: 2021-04-24 | End: 2021-04-26

## 2021-04-24 RX ADMIN — HYDROMORPHONE HYDROCHLORIDE 0.2 MG: 0.2 INJECTION, SOLUTION INTRAMUSCULAR; INTRAVENOUS; SUBCUTANEOUS at 15:32

## 2021-04-24 RX ADMIN — ENOXAPARIN SODIUM 120 MG: 120 INJECTION SUBCUTANEOUS at 01:01

## 2021-04-24 RX ADMIN — HEPARIN SODIUM 18 UNITS/KG/HR: 10000 INJECTION, SOLUTION INTRAVENOUS at 05:37

## 2021-04-24 RX ADMIN — HYDROMORPHONE HYDROCHLORIDE 1 MG: 1 INJECTION, SOLUTION INTRAMUSCULAR; INTRAVENOUS; SUBCUTANEOUS at 01:01

## 2021-04-24 RX ADMIN — ACETAMINOPHEN 650 MG: 325 TABLET ORAL at 11:31

## 2021-04-24 RX ADMIN — ACETAMINOPHEN 650 MG: 325 TABLET ORAL at 20:01

## 2021-04-24 RX ADMIN — CEFTRIAXONE 1000 MG: 1 INJECTION, SOLUTION INTRAVENOUS at 01:01

## 2021-04-24 RX ADMIN — OXYCODONE HYDROCHLORIDE 5 MG: 5 TABLET ORAL at 20:09

## 2021-04-24 RX ADMIN — HEPARIN SODIUM 18 UNITS/KG/HR: 10000 INJECTION, SOLUTION INTRAVENOUS at 15:46

## 2021-04-24 NOTE — ASSESSMENT & PLAN NOTE
S/p total knee replacement on left and partial replacement on right and L femoral ORIF  Pt reports IVC filter was placed around the time of his surgeries in order to prevent blood clotting
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
## 2021-04-24 NOTE — PROGRESS NOTES
VASCULAR SURGERY FREE TEXT PROGRESS NOTE    Results of venous duplex reveal thrombus within ivc up to filter with extension into bilateral iliac veins    Discussed results with Dr Audrey Pearson of IR, will plan for DVT Lysis/Thrombectomy on Monday (patient does not need emergency procedure at this time)  Continue Hep Gtt, ACE Wrap, and Compression

## 2021-04-24 NOTE — ASSESSMENT & PLAN NOTE
History of partial colectomy after diverticulitis  No acute issues      Plan:  -Bowel regimen as needed

## 2021-04-24 NOTE — PLAN OF CARE
Problem: PAIN - ADULT  Goal: Verbalizes/displays adequate comfort level or baseline comfort level  Description: Interventions:  - Encourage patient to monitor pain and request assistance  - Assess pain using appropriate pain scale  - Administer analgesics based on type and severity of pain and evaluate response  - Implement non-pharmacological measures as appropriate and evaluate response  - Consider cultural and social influences on pain and pain management  - Notify physician/advanced practitioner if interventions unsuccessful or patient reports new pain  Outcome: Progressing     Problem: INFECTION - ADULT  Goal: Absence or prevention of progression during hospitalization  Description: INTERVENTIONS:  - Assess and monitor for signs and symptoms of infection  - Monitor lab/diagnostic results  - Monitor all insertion sites, i e  indwelling lines, tubes, and drains  - Monitor endotracheal if appropriate and nasal secretions for changes in amount and color  - [place] appropriate cooling/warming therapies per order  - Administer medications as ordered  - Instruct and encourage patient and family to use good hand hygiene technique  - Identify and instruct in appropriate isolation precautions for identified infection/condition  Outcome: Progressing  Goal: Absence of fever/infection during neutropenic period  Description: INTERVENTIONS:  - Monitor WBC    Outcome: Progressing     Problem: SAFETY ADULT  Goal: Patient will remain free of falls  Description: INTERVENTIONS:  - Assess patient frequently for physical needs  -  Identify cognitive and physical deficits and behaviors that affect risk of falls    -  [place] fall precautions as indicated by assessment   - Educate patient/family on patient safety including physical limitations  - Instruct patient to call for assistance with activity based on assessment  - Modify environment to reduce risk of injury  - Consider OT/PT consult to assist with strengthening/mobility  Outcome: Progressing  Goal: Maintain or return to baseline ADL function  Description: INTERVENTIONS:  -  Assess patient's ability to carry out ADLs; assess patient's baseline for ADL function and identify physical deficits which impact ability to perform ADLs (bathing, care of mouth/teeth, toileting, grooming, dressing, etc )  - Assess/evaluate cause of self-care deficits   - Assess range of motion  - Assess patient's mobility; develop plan if impaired  - Assess patient's need for assistive devices and provide as appropriate  - Encourage maximum independence but intervene and supervise when necessary  - Involve family in performance of ADLs  - Assess for home care needs following discharge   - Consider OT consult to assist with ADL evaluation and planning for discharge  - Provide patient education as appropriate  Outcome: Progressing  Goal: Maintain or return mobility status to optimal level  Description: INTERVENTIONS:  - Assess patient's baseline mobility status (ambulation, transfers, stairs, etc )    - Identify cognitive and physical deficits and behaviors that affect mobility  - Identify mobility aids required to assist with transfers and/or ambulation (gait belt, sit-to-stand, lift, walker, cane, etc )  - [place] fall precautions as indicated by assessment  - Record patient progress and toleration of activity level on Mobility SBAR; progress patient to next Phase/Stage  - Instruct patient to call for assistance with activity based on assessment  - Consider rehabilitation consult to assist with strengthening/weightbearing, etc   Outcome: Progressing     Problem: SAFETY ADULT  Goal: Maintain or return to baseline ADL function  Description: INTERVENTIONS:  -  Assess patient's ability to carry out ADLs; assess patient's baseline for ADL function and identify physical deficits which impact ability to perform ADLs (bathing, care of mouth/teeth, toileting, grooming, dressing, etc )  - Assess/evaluate cause of self-care deficits   - Assess range of motion  - Assess patient's mobility; develop plan if impaired  - Assess patient's need for assistive devices and provide as appropriate  - Encourage maximum independence but intervene and supervise when necessary  - Involve family in performance of ADLs  - Assess for home care needs following discharge   - Consider OT consult to assist with ADL evaluation and planning for discharge  - Provide patient education as appropriate  Outcome: Progressing     Problem: DISCHARGE PLANNING  Goal: Discharge to home or other facility with appropriate resources  Description: INTERVENTIONS:  - Identify barriers to discharge w/patient and caregiver  - Arrange for needed discharge resources and transportation as appropriate  - Identify discharge learning needs (meds, wound care, etc )  - Arrange for interpretive services to assist at discharge as needed  - Refer to Case Management Department for coordinating discharge planning if the patient needs post-hospital services based on physician/advanced practitioner order or complex needs related to functional status, cognitive ability, or social support system  Outcome: Progressing     Problem: Knowledge Deficit  Goal: Patient/family/caregiver demonstrates understanding of disease process, treatment plan, medications, and discharge instructions  Description: Complete learning assessment and assess knowledge base    Interventions:  - Provide teaching at level of understanding  - Provide teaching via preferred learning methods  Outcome: Progressing

## 2021-04-24 NOTE — ASSESSMENT & PLAN NOTE
CT CAP on presentation shows bladder wall thickening  Pt reports slight burning with urination, cloudy urine, with both urgency and frequency for approximately 2 weeks  Admission UA w/ 10-20  WBC, occasional bacteria, nitrite negative, started on ceftriaxone in ED    Plan:  -presented with symptomatic UTI  UA revealed leukocytes, no nitrates  Patient received 3 days of IV ceftriaxone    Urine culture negative  -patient afebrile, leukocytosis improved  -discontinue antibiotic

## 2021-04-24 NOTE — ED NOTES
PATIENT RESTING QUIETLY AT THIS TIME, DENIES PAIN OR DISCOMFORT  RESPIRATIONS EVEN AND UNLABORED  MOTHER AT BEDSIDE        Khalif Sahu RN  04/23/21 0955

## 2021-04-24 NOTE — EMTALA/ACUTE CARE TRANSFER
[address] [address]  Dept: [phone]      EMTALA TRANSFER CONSENT    NAME Chantelle Abreu                                         1985                              MRN [id number]    I have been informed of my rights regarding examination, treatment, and transfer   by Dr Jacinto Morris DO    Benefits:      Risks: Potential for delay in receiving treatment, Potential deterioration of medical condition, Loss of IV, Increased discomfort during transfer, Possible worsening of condition or death during transfer      Consent for Transfer:  I acknowledge that my medical condition has been evaluated and explained to me by the emergency department physician or other qualified medical person and/or my attending physician, who has recommended that I be transferred to the service of  Accepting Physician: Dr Celestina Escalera accepting on behalf of Dr Roderick Norton at [address] [place] [address] Name, [address] : [facility]  The above potential benefits of such transfer, the potential risks associated with such transfer, and the probable risks of not being transferred have been explained to me, and I fully understand them  The doctor has explained that, in my case, the benefits of transfer outweigh the risks  I agree to be transferred  I authorize the performance of emergency medical procedures and treatments upon me in both transit and upon arrival at the receiving facility  Additionally, I authorize the release of any and all medical records to the receiving facility and request they be transported with me, if possible  I understand that the safest mode of transportation during a medical emergency is an ambulance and that the Hospital advocates the use of this mode of transport   Risks of traveling to the receiving facility by car, including absence of medical control, life sustaining equipment, such as oxygen, and medical personnel has been explained to me and I fully understand them  (SHANE CORRECT BOX BELOW)  [  ]  I consent to the stated transfer and to be transported by ambulance/helicopter  [  ]  I consent to the stated transfer, but refuse transportation by ambulance and accept full responsibility for my transportation by car  I understand the risks of non-ambulance transfers and I exonerate the Hospital and its staff from any deterioration in my condition that results from this refusal     X___________________________________________    DATE  21  TIME________  Signature of patient or legally responsible individual signing on patient behalf           RELATIONSHIP TO PATIENT_________________________          Provider Certification    NAME Larisa Bojorquez                                        [facility] 1985                              MRN [id number]    A medical screening exam was performed on the above named patient  Based on the examination:    Condition Necessitating Transfer There were no encounter diagnoses      Patient Condition: The patient has been stabilized such that within reasonable medical probability, no material deterioration of the patient condition or the condition of the unborn child(víctor) is likely to result from the transfer    Reason for Transfer: Level of Care needed not available at this facility, Third party payor request    Transfer Requirements: Facility [facility]   · Space available and qualified personnel available for treatment as acknowledged by    · Agreed to accept transfer and to provide appropriate medical treatment as acknowledged by       Dr Alvin Metz accepting on behalf of Dr Belgica Mcfarlane  · Appropriate medical records of the examination and treatment of the patient are provided at the time of transfer   [address], [address] _______  · Transfer will be performed by qualified personnel from    and appropriate transfer equipment as required, including the use of necessary and appropriate life support measures  Provider Certification: I have examined the patient and explained the following risks and benefits of being transferred/refusing transfer to the patient/family:  General risk, such as traffic hazards, adverse weather conditions, rough terrain or turbulence, possible failure of equipment (including vehicle or aircraft), or consequences of actions of persons outside the control of the transport personnel, Unanticipated needs of medical equipment and personnel during transport, Risk of worsening condition, The possibility of a transport vehicle being unavailable      Based on these reasonable risks and benefits to the patient and/or the unborn child(víctor), and based upon the information available at the time of the patients examination, I certify that the medical benefits reasonably to be expected from the provision of appropriate medical treatments at another medical facility outweigh the increasing risks, if any, to the individuals medical condition, and in the case of labor to the unborn child, from effecting the transfer      X____________________________________________ DATE 04/24/21        TIME_______      ORIGINAL - SEND TO MEDICAL RECORDS   COPY - SEND WITH PATIENT DURING TRANSFER

## 2021-04-24 NOTE — ED NOTES
PATIENT RESTING QUIETLY AT THIS TIME, REPORTS 5/10 PAIN IN LEFT GROIN "IF I TRY TO MOVE MY LEG MUCH"  RESPIRATIONS EVEN AND UNLABORED  MOTHER AT BEDSIDE        Philip Delcid RN  04/24/21 2214
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
## 2021-04-24 NOTE — TELEMEDICINE
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

## 2021-04-24 NOTE — ED CARE HANDOFF
Emergency Department Sign Out Note        Sign out and transfer of care from Dr Olimpia Sarmiento  See Separate Emergency Department note  The patient, Dionne Song, was evaluated by the previous provider for left leg pain and shortness of breath  Workup Completed:  Blood    ED Course / Workup Pending (followup):  CTA chest/abdomen/pelvis                                  ED Course as of Apr 24 0133   Sat Apr 24, 2021   0035 Case discussed with vascular surgeon on-call, Dr Chanda Rico, who recommend Ace wrapping left lower extremity and transferring patient about [facility] for possible thrombectomy  Patient agrees with transfer plans  EMTALA completed by patient  0107 Case discussed with internal medicine resident Dr Marilyn Reis who accepts pt for transfer under attending, Dr Hu Wells  Procedures  MDM  Number of Diagnoses or Management Options  DVT (deep venous thrombosis) [facility]):   Left leg pain:   SOB (shortness of breath):      Amount and/or Complexity of Data Reviewed  Clinical lab tests: reviewed  Tests in the radiology section of CPT®: reviewed  Tests in the medicine section of CPT®: ordered and reviewed  Review and summarize past medical records: yes  Independent visualization of images, tracings, or specimens: yes    Risk of Complications, Morbidity, and/or Mortality  General comments: CTA showed the following findings:  "IVC filter appears in place  No evidence of aneurysm, or dissection  Although there is no contrast within the veins, I suspect thrombus within the distal IVC, left common iliac vein, left external iliac vein and left femoral vein  Follow-up DVT vascular   ultrasound is recommended  Diffuse soft tissue edema of the left upper thigh is consistent with suspected left leg DVT extending into the IVC "  Patient's pain was controlled in the ED with IV narcotics  CT also showed some bladder wall thickening suspecting cystitis    UA showed WBC without any leukocyte esterase or nitrates  Patient empirically given Rocephin  Case discussed with vascular surgeon on-call, Dr Kian Leblanc, who recommend Ace wrapping left lower extremity and transferring patient about [facility] for possible thrombectomy  Patient agrees with transfer plans  EMTALA completed by patient  Patient stable at time of transfer  Please Note: Fluency Direct voice recognition software may have been used in the creation of this document   Wrong words or sound a like substitutions may have occurred due to the inherent limitations of the voice software        Patient Progress  Patient progress: stable      Disposition  Final diagnoses:   DVT (deep venous thrombosis) ([facility])   Left leg pain   SOB (shortness of breath)     Time reflects when diagnosis was documented in both MDM as applicable and the Disposition within this note     Time User Action Codes Description Comment    4/24/2021  1:32 AM Seema Mae Add [I82 [id number]] DVT (deep venous thrombosis) ([address] )     4/24/2021  1:33 AM Jasmina Junior Add [M79 [id number]] Left leg pain     4/24/2021  1:33 AM Seema Mae Add [R06 [id number]] SOB (shortness of breath)       ED Disposition     ED Disposition Condition Date/Time Comment    Transfer to Another Facility-In Network  Sat Apr 24, 2021  1:08 AM Eliz Forte should be transferred out to [facility]        MD Documentation      Most Recent Value   Patient Condition  The patient has been stabilized such that within reasonable medical probability, no material deterioration of the patient condition or the condition of the unborn child(víctor) is likely to result from the transfer   Reason for Transfer  Level of Care needed not available at this facility, Third party payor request   Risks of Transfer  Potential for delay in receiving treatment, Potential deterioration of medical condition, Loss of IV, Increased discomfort during transfer, Possible worsening of condition or death during transfer   Accepting Physician  Dr Cindy Doe accepting on behalf of Dr Dany Chowdary Name, Bard Chu   Sending MD Rudolph Gilman DO   Provider Certification  General risk, such as traffic hazards, adverse weather conditions, rough terrain or turbulence, possible failure of equipment (including vehicle or aircraft), or consequences of actions of persons outside the control of the transport personnel, Unanticipated needs of medical equipment and personnel during transport, Risk of worsening condition, The possibility of a transport vehicle being unavailable      RN Documentation      [address] Name, [address]   SLB      Follow-up Information    None       Patient's Medications    No medications on file     No discharge procedures on file         ED Provider  Electronically Signed by     Rudolph Gilman DO  04/24/21 0132
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
## 2021-04-25 PROBLEM — I82.90 VENOUS THROMBOSIS: Status: ACTIVE | Noted: 2021-04-24

## 2021-04-25 PROBLEM — D64.9 ANEMIA: Status: ACTIVE | Noted: 2021-04-25

## 2021-04-25 LAB
ANION GAP SERPL CALCULATED.3IONS-SCNC: 5 MMOL/L (ref 4–13)
APTT PPP: 90 SECONDS (ref 23–37)
BACTERIA UR CULT: NORMAL
BUN SERPL-MCNC: 10 MG/DL (ref 5–25)
CALCIUM SERPL-MCNC: 9.2 MG/DL (ref 8.3–10.1)
CHLORIDE SERPL-SCNC: 107 MMOL/L (ref 100–108)
CO2 SERPL-SCNC: 26 MMOL/L (ref 21–32)
CREAT SERPL-MCNC: 0.97 MG/DL (ref 0.6–1.3)
ERYTHROCYTE [DISTWIDTH] IN BLOOD BY AUTOMATED COUNT: 13.7 % (ref 11.6–15.1)
GFR SERPL CREATININE-BSD FRML MDRD: 116 ML/MIN/1.73SQ M
GLUCOSE SERPL-MCNC: 95 MG/DL (ref 65–140)
HCT VFR BLD AUTO: 32.1 % (ref 36.5–49.3)
HGB BLD-MCNC: 9.6 G/DL (ref 12–17)
MCH RBC QN AUTO: 25.4 PG (ref 26.8–34.3)
MCHC RBC AUTO-ENTMCNC: 29.9 G/DL (ref 31.4–37.4)
MCV RBC AUTO: 85 FL (ref 82–98)
PLATELET # BLD AUTO: 305 THOUSANDS/UL (ref 149–390)
PMV BLD AUTO: 11.2 FL (ref 8.9–12.7)
POTASSIUM SERPL-SCNC: 3.8 MMOL/L (ref 3.5–5.3)
RBC # BLD AUTO: 3.78 MILLION/UL (ref 3.88–5.62)
SODIUM SERPL-SCNC: 138 MMOL/L (ref 136–145)
WBC # BLD AUTO: 10.35 THOUSAND/UL (ref 4.31–10.16)

## 2021-04-25 PROCEDURE — NC001 PR NO CHARGE: Performed by: SURGERY

## 2021-04-25 PROCEDURE — 99232 SBSQ HOSP IP/OBS MODERATE 35: CPT | Performed by: INTERNAL MEDICINE

## 2021-04-25 PROCEDURE — 85730 THROMBOPLASTIN TIME PARTIAL: CPT | Performed by: INTERNAL MEDICINE

## 2021-04-25 PROCEDURE — 80048 BASIC METABOLIC PNL TOTAL CA: CPT | Performed by: STUDENT IN AN ORGANIZED HEALTH CARE EDUCATION/TRAINING PROGRAM

## 2021-04-25 PROCEDURE — 85027 COMPLETE CBC AUTOMATED: CPT | Performed by: STUDENT IN AN ORGANIZED HEALTH CARE EDUCATION/TRAINING PROGRAM

## 2021-04-25 RX ADMIN — HYDROMORPHONE HYDROCHLORIDE 0.2 MG: 0.2 INJECTION, SOLUTION INTRAMUSCULAR; INTRAVENOUS; SUBCUTANEOUS at 21:10

## 2021-04-25 RX ADMIN — HYDROMORPHONE HYDROCHLORIDE 0.2 MG: 0.2 INJECTION, SOLUTION INTRAMUSCULAR; INTRAVENOUS; SUBCUTANEOUS at 12:26

## 2021-04-25 RX ADMIN — OXYCODONE HYDROCHLORIDE 5 MG: 5 TABLET ORAL at 09:23

## 2021-04-25 RX ADMIN — CEFTRIAXONE 1000 MG: 1 INJECTION, POWDER, FOR SOLUTION INTRAMUSCULAR; INTRAVENOUS at 00:10

## 2021-04-25 RX ADMIN — CEFTRIAXONE 1000 MG: 1 INJECTION, POWDER, FOR SOLUTION INTRAMUSCULAR; INTRAVENOUS at 23:20

## 2021-04-25 RX ADMIN — HEPARIN SODIUM 18 UNITS/KG/HR: 10000 INJECTION, SOLUTION INTRAVENOUS at 12:18

## 2021-04-25 RX ADMIN — HEPARIN SODIUM 18 UNITS/KG/HR: 10000 INJECTION, SOLUTION INTRAVENOUS at 01:18

## 2021-04-25 RX ADMIN — OXYCODONE HYDROCHLORIDE 5 MG: 5 TABLET ORAL at 23:17

## 2021-04-25 RX ADMIN — OXYCODONE HYDROCHLORIDE 5 MG: 5 TABLET ORAL at 02:22

## 2021-04-25 RX ADMIN — OXYCODONE HYDROCHLORIDE 5 MG: 5 TABLET ORAL at 16:55

## 2021-04-25 NOTE — ASSESSMENT & PLAN NOTE
Hgb 12 6 on admission, per chart review persistent anemia w/ baseline 8-9     Plan:   -Hgb 8 3 stable   -no signs of external/internal bleeding clinically   -continue to monitor h/h    -if stable tomorrow, can dc w/ outpatient GI f/u and likely need for scope  -Iron panel 4/24 : Fe 20, [place] 239, Sat 9, TIBC 235    -s/p 300 mg venofer, to be discharged on PO iron
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
## 2021-04-26 ENCOUNTER — ANESTHESIA (INPATIENT)
Dept: RADIOLOGY | Facility: HOSPITAL | Age: 36
DRG: 271 | End: 2021-04-26
Payer: COMMERCIAL

## 2021-04-26 ENCOUNTER — APPOINTMENT (INPATIENT)
Dept: RADIOLOGY | Facility: HOSPITAL | Age: 36
DRG: 271 | End: 2021-04-26
Attending: RADIOLOGY
Payer: COMMERCIAL

## 2021-04-26 ENCOUNTER — APPOINTMENT (INPATIENT)
Dept: RADIOLOGY | Facility: HOSPITAL | Age: 36
DRG: 271 | End: 2021-04-26
Payer: COMMERCIAL

## 2021-04-26 ENCOUNTER — ANESTHESIA EVENT (INPATIENT)
Dept: RADIOLOGY | Facility: HOSPITAL | Age: 36
DRG: 271 | End: 2021-04-26
Payer: COMMERCIAL

## 2021-04-26 LAB
ABO GROUP BLD: NORMAL
ABO GROUP BLD: NORMAL
APTT PPP: 117 SECONDS (ref 23–37)
BLD GP AB SCN SERPL QL: NEGATIVE
ERYTHROCYTE [DISTWIDTH] IN BLOOD BY AUTOMATED COUNT: 13.8 % (ref 11.6–15.1)
FIBRINOGEN PPP-MCNC: 685 MG/DL (ref 227–495)
FOLATE SERPL-MCNC: 10 NG/ML (ref 3.1–17.5)
HCT VFR BLD AUTO: 31.9 % (ref 36.5–49.3)
HGB BLD-MCNC: 9.6 G/DL (ref 12–17)
HGB RETIC QN AUTO: 25.6 PG (ref 30–38.3)
IMM RETICS NFR: 8.5 % (ref 0–14)
MCH RBC QN AUTO: 25.1 PG (ref 26.8–34.3)
MCHC RBC AUTO-ENTMCNC: 30.1 G/DL (ref 31.4–37.4)
MCV RBC AUTO: 84 FL (ref 82–98)
PLATELET # BLD AUTO: 340 THOUSANDS/UL (ref 149–390)
PMV BLD AUTO: 11.6 FL (ref 8.9–12.7)
RBC # BLD AUTO: 3.82 MILLION/UL (ref 3.88–5.62)
RETICS # AUTO: ABNORMAL 10*3/UL (ref 14356–105094)
RETICS # CALC: 1 % (ref 0.37–1.87)
RH BLD: POSITIVE
RH BLD: POSITIVE
SPECIMEN EXPIRATION DATE: NORMAL
VIT B12 SERPL-MCNC: 553 PG/ML (ref 100–900)
WBC # BLD AUTO: 10.62 THOUSAND/UL (ref 4.31–10.16)

## 2021-04-26 PROCEDURE — 82746 ASSAY OF FOLIC ACID SERUM: CPT | Performed by: STUDENT IN AN ORGANIZED HEALTH CARE EDUCATION/TRAINING PROGRAM

## 2021-04-26 PROCEDURE — C1887 CATHETER, GUIDING: HCPCS

## 2021-04-26 PROCEDURE — C1769 GUIDE WIRE: HCPCS

## 2021-04-26 PROCEDURE — C1751 CATH, INF, PER/CENT/MIDLINE: HCPCS

## 2021-04-26 PROCEDURE — 85730 THROMBOPLASTIN TIME PARTIAL: CPT | Performed by: SURGERY

## 2021-04-26 PROCEDURE — 85347 COAGULATION TIME ACTIVATED: CPT

## 2021-04-26 PROCEDURE — 82607 VITAMIN B-12: CPT | Performed by: STUDENT IN AN ORGANIZED HEALTH CARE EDUCATION/TRAINING PROGRAM

## 2021-04-26 PROCEDURE — 85027 COMPLETE CBC AUTOMATED: CPT | Performed by: STUDENT IN AN ORGANIZED HEALTH CARE EDUCATION/TRAINING PROGRAM

## 2021-04-26 PROCEDURE — G1004 CDSM NDSC: HCPCS

## 2021-04-26 PROCEDURE — 37212 THROMBOLYTIC VENOUS THERAPY: CPT | Performed by: RADIOLOGY

## 2021-04-26 PROCEDURE — 85384 FIBRINOGEN ACTIVITY: CPT | Performed by: SURGERY

## 2021-04-26 PROCEDURE — 86850 RBC ANTIBODY SCREEN: CPT | Performed by: HOSPITALIST

## 2021-04-26 PROCEDURE — 99291 CRITICAL CARE FIRST HOUR: CPT | Performed by: SURGERY

## 2021-04-26 PROCEDURE — 85730 THROMBOPLASTIN TIME PARTIAL: CPT | Performed by: INTERNAL MEDICINE

## 2021-04-26 PROCEDURE — 85027 COMPLETE CBC AUTOMATED: CPT | Performed by: SURGERY

## 2021-04-26 PROCEDURE — 76937 US GUIDE VASCULAR ACCESS: CPT

## 2021-04-26 PROCEDURE — 85384 FIBRINOGEN ACTIVITY: CPT | Performed by: HOSPITALIST

## 2021-04-26 PROCEDURE — 70450 CT HEAD/BRAIN W/O DYE: CPT

## 2021-04-26 PROCEDURE — 85046 RETICYTE/HGB CONCENTRATE: CPT | Performed by: STUDENT IN AN ORGANIZED HEALTH CARE EDUCATION/TRAINING PROGRAM

## 2021-04-26 PROCEDURE — 37188 VEN MECHNL THRMBC REPEAT TX: CPT

## 2021-04-26 PROCEDURE — C1894 INTRO/SHEATH, NON-LASER: HCPCS

## 2021-04-26 PROCEDURE — 37187 VENOUS MECH THROMBECTOMY: CPT | Performed by: RADIOLOGY

## 2021-04-26 PROCEDURE — 37187 VENOUS MECH THROMBECTOMY: CPT

## 2021-04-26 PROCEDURE — 75825 VEIN X-RAY TRUNK: CPT

## 2021-04-26 PROCEDURE — 36012 PLACE CATHETER IN VEIN: CPT | Performed by: RADIOLOGY

## 2021-04-26 PROCEDURE — 76937 US GUIDE VASCULAR ACCESS: CPT | Performed by: RADIOLOGY

## 2021-04-26 PROCEDURE — 86901 BLOOD TYPING SEROLOGIC RH(D): CPT | Performed by: HOSPITALIST

## 2021-04-26 PROCEDURE — 36005 INJECTION EXT VENOGRAPHY: CPT | Performed by: RADIOLOGY

## 2021-04-26 PROCEDURE — 86900 BLOOD TYPING SEROLOGIC ABO: CPT | Performed by: HOSPITALIST

## 2021-04-26 PROCEDURE — 99232 SBSQ HOSP IP/OBS MODERATE 35: CPT | Performed by: HOSPITALIST

## 2021-04-26 RX ORDER — SUCCINYLCHOLINE/SOD CL,ISO/PF 100 MG/5ML
SYRINGE (ML) INTRAVENOUS AS NEEDED
Status: DISCONTINUED | OUTPATIENT
Start: 2021-04-26 | End: 2021-04-26

## 2021-04-26 RX ORDER — HEPARIN SODIUM 200 [USP'U]/100ML
20 INJECTION, SOLUTION INTRAVENOUS CONTINUOUS
Status: DISCONTINUED | OUTPATIENT
Start: 2021-04-26 | End: 2021-04-27

## 2021-04-26 RX ORDER — ONDANSETRON 2 MG/ML
4 INJECTION INTRAMUSCULAR; INTRAVENOUS ONCE AS NEEDED
Status: DISCONTINUED | OUTPATIENT
Start: 2021-04-26 | End: 2021-04-26 | Stop reason: HOSPADM

## 2021-04-26 RX ORDER — PROPOFOL 10 MG/ML
INJECTION, EMULSION INTRAVENOUS AS NEEDED
Status: DISCONTINUED | OUTPATIENT
Start: 2021-04-26 | End: 2021-04-26

## 2021-04-26 RX ORDER — HEPARIN SODIUM 1000 [USP'U]/ML
INJECTION, SOLUTION INTRAVENOUS; SUBCUTANEOUS AS NEEDED
Status: DISCONTINUED | OUTPATIENT
Start: 2021-04-26 | End: 2021-04-26

## 2021-04-26 RX ORDER — LIDOCAINE HYDROCHLORIDE 10 MG/ML
INJECTION, SOLUTION EPIDURAL; INFILTRATION; INTRACAUDAL; PERINEURAL AS NEEDED
Status: DISCONTINUED | OUTPATIENT
Start: 2021-04-26 | End: 2021-04-26

## 2021-04-26 RX ORDER — HEPARIN SODIUM 200 [USP'U]/100ML
20 INJECTION, SOLUTION INTRAVENOUS CONTINUOUS
Status: DISCONTINUED | OUTPATIENT
Start: 2021-04-26 | End: 2021-04-26

## 2021-04-26 RX ORDER — HEPARIN SODIUM 200 [USP'U]/100ML
20 INJECTION, SOLUTION INTRAVENOUS CONTINUOUS
Status: DISCONTINUED | OUTPATIENT
Start: 2021-04-26 | End: 2021-04-26 | Stop reason: SDUPTHER

## 2021-04-26 RX ORDER — MIDAZOLAM HYDROCHLORIDE 2 MG/2ML
INJECTION, SOLUTION INTRAMUSCULAR; INTRAVENOUS AS NEEDED
Status: DISCONTINUED | OUTPATIENT
Start: 2021-04-26 | End: 2021-04-26

## 2021-04-26 RX ORDER — SODIUM CHLORIDE 9 MG/ML
INJECTION, SOLUTION INTRAVENOUS CONTINUOUS PRN
Status: DISCONTINUED | OUTPATIENT
Start: 2021-04-26 | End: 2021-04-26

## 2021-04-26 RX ORDER — HEPARIN SODIUM 10000 [USP'U]/100ML
800 INJECTION, SOLUTION INTRAVENOUS
Status: DISCONTINUED | OUTPATIENT
Start: 2021-04-26 | End: 2021-04-27

## 2021-04-26 RX ORDER — ONDANSETRON 2 MG/ML
INJECTION INTRAMUSCULAR; INTRAVENOUS AS NEEDED
Status: DISCONTINUED | OUTPATIENT
Start: 2021-04-26 | End: 2021-04-26

## 2021-04-26 RX ORDER — FENTANYL CITRATE/PF 50 MCG/ML
25 SYRINGE (ML) INJECTION
Status: COMPLETED | OUTPATIENT
Start: 2021-04-26 | End: 2021-04-26

## 2021-04-26 RX ORDER — DEXMEDETOMIDINE HYDROCHLORIDE 100 UG/ML
INJECTION, SOLUTION INTRAVENOUS AS NEEDED
Status: DISCONTINUED | OUTPATIENT
Start: 2021-04-26 | End: 2021-04-26

## 2021-04-26 RX ORDER — HYDROMORPHONE HCL IN WATER/PF 6 MG/30 ML
0.2 PATIENT CONTROLLED ANALGESIA SYRINGE INTRAVENOUS
Status: DISCONTINUED | OUTPATIENT
Start: 2021-04-26 | End: 2021-04-26 | Stop reason: HOSPADM

## 2021-04-26 RX ORDER — FENTANYL CITRATE 50 UG/ML
INJECTION, SOLUTION INTRAMUSCULAR; INTRAVENOUS AS NEEDED
Status: DISCONTINUED | OUTPATIENT
Start: 2021-04-26 | End: 2021-04-26

## 2021-04-26 RX ORDER — DEXAMETHASONE SODIUM PHOSPHATE 10 MG/ML
INJECTION, SOLUTION INTRAMUSCULAR; INTRAVENOUS AS NEEDED
Status: DISCONTINUED | OUTPATIENT
Start: 2021-04-26 | End: 2021-04-26

## 2021-04-26 RX ADMIN — FENTANYL CITRATE 100 MCG: 50 INJECTION INTRAMUSCULAR; INTRAVENOUS at 14:51

## 2021-04-26 RX ADMIN — SODIUM CHLORIDE: 0.9 INJECTION, SOLUTION INTRAVENOUS at 14:47

## 2021-04-26 RX ADMIN — ONDANSETRON 4 MG: 2 INJECTION INTRAMUSCULAR; INTRAVENOUS at 15:23

## 2021-04-26 RX ADMIN — FENTANYL CITRATE 50 MCG: 50 INJECTION INTRAMUSCULAR; INTRAVENOUS at 16:27

## 2021-04-26 RX ADMIN — ALTEPLASE 0.5 MG/HR: 2.2 INJECTION, POWDER, LYOPHILIZED, FOR SOLUTION INTRAVENOUS at 18:15

## 2021-04-26 RX ADMIN — PROPOFOL 50 MG: 10 INJECTION, EMULSION INTRAVENOUS at 17:56

## 2021-04-26 RX ADMIN — HEPARIN SODIUM 18 UNITS/KG/HR: 10000 INJECTION, SOLUTION INTRAVENOUS at 00:00

## 2021-04-26 RX ADMIN — HYDROMORPHONE HYDROCHLORIDE 0.2 MG: 0.2 INJECTION, SOLUTION INTRAMUSCULAR; INTRAVENOUS; SUBCUTANEOUS at 19:22

## 2021-04-26 RX ADMIN — LIDOCAINE HYDROCHLORIDE 50 MG: 10 INJECTION, SOLUTION EPIDURAL; INFILTRATION; INTRACAUDAL; PERINEURAL at 14:51

## 2021-04-26 RX ADMIN — MIDAZOLAM 2 MG: 1 INJECTION INTRAMUSCULAR; INTRAVENOUS at 14:46

## 2021-04-26 RX ADMIN — Medication 25 MCG: at 19:07

## 2021-04-26 RX ADMIN — PROPOFOL 20 MG: 10 INJECTION, EMULSION INTRAVENOUS at 18:02

## 2021-04-26 RX ADMIN — ALTEPLASE 8 MG: 2.2 INJECTION, POWDER, LYOPHILIZED, FOR SOLUTION INTRAVENOUS at 15:59

## 2021-04-26 RX ADMIN — DEXMEDETOMIDINE HCL 8 MCG: 100 INJECTION INTRAVENOUS at 18:05

## 2021-04-26 RX ADMIN — OXYCODONE HYDROCHLORIDE 5 MG: 5 TABLET ORAL at 20:34

## 2021-04-26 RX ADMIN — Medication 25 MCG: at 18:50

## 2021-04-26 RX ADMIN — HEPARIN SODIUM IN SODIUM CHLORIDE 20 UNITS/HR: 200 INJECTION INTRAVENOUS at 18:15

## 2021-04-26 RX ADMIN — IOHEXOL 45 ML: 350 INJECTION, SOLUTION INTRAVENOUS at 17:53

## 2021-04-26 RX ADMIN — PHENYLEPHRINE HYDROCHLORIDE 100 MCG: 10 INJECTION INTRAVENOUS at 15:52

## 2021-04-26 RX ADMIN — Medication 25 MCG: at 19:17

## 2021-04-26 RX ADMIN — Medication 25 MCG: at 18:45

## 2021-04-26 RX ADMIN — HYDROMORPHONE HYDROCHLORIDE 0.2 MG: 0.2 INJECTION, SOLUTION INTRAMUSCULAR; INTRAVENOUS; SUBCUTANEOUS at 21:28

## 2021-04-26 RX ADMIN — PROPOFOL 200 MG: 10 INJECTION, EMULSION INTRAVENOUS at 14:51

## 2021-04-26 RX ADMIN — HEPARIN SODIUM 15 UNITS/KG/HR: 10000 INJECTION, SOLUTION INTRAVENOUS at 13:59

## 2021-04-26 RX ADMIN — HEPARIN SODIUM 5000 UNITS: 1000 INJECTION INTRAVENOUS; SUBCUTANEOUS at 16:29

## 2021-04-26 RX ADMIN — HEPARIN SODIUM 3000 UNITS: 1000 INJECTION INTRAVENOUS; SUBCUTANEOUS at 17:07

## 2021-04-26 RX ADMIN — FENTANYL CITRATE 50 MCG: 50 INJECTION INTRAMUSCULAR; INTRAVENOUS at 17:37

## 2021-04-26 RX ADMIN — Medication 100 MG: at 14:52

## 2021-04-26 RX ADMIN — DEXMEDETOMIDINE HCL 8 MCG: 100 INJECTION INTRAVENOUS at 17:48

## 2021-04-26 RX ADMIN — DEXAMETHASONE SODIUM PHOSPHATE 10 MG: 10 INJECTION, SOLUTION INTRAMUSCULAR; INTRAVENOUS at 15:17

## 2021-04-26 NOTE — ANESTHESIA PREPROCEDURE EVALUATION
Procedure:  IR DVT THROMBOLYSIS/THROMBECTOMY ILIAC/IVC WITH VENOGRAM    Relevant Problems   HEMATOLOGY   (+) Anemia      hgb 9 6, cr 0 97       Anesthesia Plan  ASA Score- 3     Anesthesia Type- general with ASA Monitors  Additional Monitors:   Airway Plan: ETT  Comment: General anesthesia, endotracheal tube; standard ASA monitors  Risks and benefits discussed with patient; patient consented and agrees to proceed  I saw and evaluated the patient  If seen with CRNA, we have discussed the anesthetic plan and I am in agreement that the plan is appropriate for the patient  prone  Plan Factors-    Induction- intravenous  Postoperative Plan- Plan for postoperative opioid use  Planned trial extubation    Informed Consent- Anesthetic plan and risks discussed with patient  I personally reviewed this patient with the CRNA  Discussed and agreed on the Anesthesia Plan with the CRNA  Shreyas Olmstead

## 2021-04-26 NOTE — PLAN OF CARE
Problem: PAIN - ADULT  Goal: Verbalizes/displays adequate comfort level or baseline comfort level  Description: Interventions:  - Encourage patient to monitor pain and request assistance  - Assess pain using appropriate pain scale  - Administer analgesics based on type and severity of pain and evaluate response  - Implement non-pharmacological measures as appropriate and evaluate response  - Consider cultural and social influences on pain and pain management  - Notify physician/advanced practitioner if interventions unsuccessful or patient reports new pain  Outcome: Progressing     Problem: INFECTION - ADULT  Goal: Absence or prevention of progression during hospitalization  Description: INTERVENTIONS:  - Assess and monitor for signs and symptoms of infection  - Monitor lab/diagnostic results  - Monitor all insertion sites, i e  indwelling lines, tubes, and drains  - Monitor endotracheal if appropriate and nasal secretions for changes in amount and color  - [place] appropriate cooling/warming therapies per order  - Administer medications as ordered  - Instruct and encourage patient and family to use good hand hygiene technique  - Identify and instruct in appropriate isolation precautions for identified infection/condition  Outcome: Progressing     Problem: SAFETY ADULT  Goal: Patient will remain free of falls  Description: INTERVENTIONS:  - Assess patient frequently for physical needs  -  Identify cognitive and physical deficits and behaviors that affect risk of falls    -  [place] fall precautions as indicated by assessment   - Educate patient/family on patient safety including physical limitations  - Instruct patient to call for assistance with activity based on assessment  - Modify environment to reduce risk of injury  - Consider OT/PT consult to assist with strengthening/mobility  Outcome: Progressing     Problem: Knowledge Deficit  Goal: Patient/family/caregiver demonstrates understanding of disease process, treatment plan, medications, and discharge instructions  Description: Complete learning assessment and assess knowledge base  Interventions:  - Provide teaching at level of understanding  - Provide teaching via preferred learning methods  Outcome: Progressing     Problem: Potential for Falls  Goal: Patient will remain free of falls  Description: INTERVENTIONS:  - Assess patient frequently for physical needs  -  Identify cognitive and physical deficits and behaviors that affect risk of falls    -  [place] fall precautions as indicated by assessment   - Educate patient/family on patient safety including physical limitations  - Instruct patient to call for assistance with activity based on assessment  - Modify environment to reduce risk of injury  - Consider OT/PT consult to assist with strengthening/mobility  Outcome: Progressing
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
## 2021-04-26 NOTE — BRIEF OP NOTE (RAD/CATH)
INTERVENTIONAL RADIOLOGY PROCEDURE NOTE    Date: 4/26/2021    Procedure: IR DVT THROMBOLYSIS/THROMBECTOMY ILIAC/IVC WITH VENOGRAM    Preoperative diagnosis:   1  Acute deep vein thrombosis (DVT) of left lower extremity, unspecified vein (HCC)    2  Iliocaval venous thrombosis         Postoperative diagnosis: Same  Surgeon: Mariely Tay MD     Assistant: None  No qualified resident was available  Blood loss:  500 cc    Specimens:  None     Findings:  Ileal caval thrombosis and left leg DVT  Mechanical thrombectomy performed without flow restoration  Bilateral popliteal access for bilateral tPA infusion  Both infusion catheters started at 0 5 mg tPA each for a total dose of 1 mg an hour  Sub therapeutic heparin at 800 units in our to be titrated for PTT 40-60  Lysis recheck tomorrow probably in afternoon    Complications: None immediate      Anesthesia: general

## 2021-04-26 NOTE — CASE MANAGEMENT
LOS:  2 days  Bundle: no  Readmission: no    Explained role of CM to pt  CM obtained the following information from pt  Home: lives in a house that has 2 levels and 1 flight of stairs in between  [address]  Lives with: Significant other and 5 children   DME: denies  Ambulation: independent  Drives: yes  Ph[address]acy:  [address]  PCP: Zacarias Mcmullen History: denies inpatient psychiatric stay ever  Substance Use/Abuse History:  denies  [facility] History: denies  Work/Retired:  employed  Rehab History: denies  POA/LW: no and declined information on POA/LW  Transport at [facility]:  Family     CM reviewed d/c planning process including the following: identifying help at home, patient preference for d/c planning needs,  [facility] Meds to Bed program     Cm asked pt if he was unable to make decisions for himself who would he want to make decisions for him   He declined to chose anyone  Cm asked pt if he would like CM to call anyone he declined
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
## 2021-04-26 NOTE — DISCHARGE INSTRUCTIONS
Pelvic venogram     What is a venogram?  A venogram is an x-ray test that involves injecting contrast material into a vein to shows how blood flows through your veins  This allows a physician to determine the condition of your veins    What are some common uses of the procedure? A venogram is commonly used to:   assess the status of a vein or system of veins    find blood clots within the veins    assess varicose veins before surgery    find a vein in good condition to use for a bypass procedure or dialysis access    help a physician place an IV or a medical device, such as a stent, in a vein    guide treatment of diseased veins  How is the procedure performed? This examination is usually done on an outpatient basis  A venogram is done in a hospital x-ray department  A venogram is performed in the x-ray department or in an interventional radiology suite, sometimes called special procedures suite  You will lie on an x-ray table  Depending on the body part being examined (e g , the legs), the table may be situated to a standing position  If the table is repositioned during the procedure, you will be secured with safety straps  The physician will insert a needle or catheter into a vein to inject the contrast agent  Where that needle is placed depends upon the area of your body where the veins are being evaluated  As the contrast material flows through the veins being examined, several x-rays are taken  You may be moved into different positions so that the x-rays can take pictures of your veins at different angles  What will I experience during and after the procedure?   You will be asked to remove some of your clothes and to wear a gown during the exam  You may also be asked to remove jewelry, removable dental appliances, eye-glasses and any metal objects or clothing that might interfere with the x-ray images  You will feel a slight pinch when the needle is inserted into your vein for the IV line and when the local anesthetic is injected  Most of the sensation is at the skin incision site  This is numbed using local anesthetic  You may feel pressure when the catheter is inserted into the vein or artery  However, you will not feel serious discomfort  As the contrast material passes through your body, you may feel warm  This will quickly pass  You may have a metallic taste in your mouth  Your arm or leg may feel like it is getting numb or "falling asleep " After the test is complete, this feeling will go away  You must hold very still and may be asked to keep from breathing for a few seconds while the x-ray picture is taken to reduce the possibility of a blurred image  The technologist will walk behind a wall or into the next room to activate the x-ray machine  When the examination is complete, you may be asked to wait until the radiologist determines that all the necessary images have been obtained  A venogram takes between 30 and 90 minutes to perform  Fluids will be run through your IV to remove the contrast material from your veins  You will also be instructed to drink a lot of fluids for the next day  After the catheter is removed, a bandage will be placed on the IV site  Then you will be observed for any signs of complications, such as bleeding from the injection site, infection or an allergic reaction        Please call   [address] [place] [address] Interventional Radiology at [phone]   with any questions or concerns about venogram
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
## 2021-04-26 NOTE — PROGRESS NOTES
H&P reviewed  There have been no interval changes since the time the H&P was written  /79   Pulse 71   Temp 98 4 °F (36 9 °C)   Resp 16   Ht 6' (1 829 m)   Wt 120 kg (264 lb)   SpO2 95%   BMI 35 80 kg/m²     Prior imaging was reviewed  Acute Ileal caval thrombosis and left leg DVT with prior history of right leg DVT  Optional filter in place  Plan for pharmacomechanical thrombectomy/thrombolysis  Procedure discussed and questions answered  Informed written consent was obtained      Kristyn Kendall MD

## 2021-04-26 NOTE — ANESTHESIA POSTPROCEDURE EVALUATION
Post-Op Assessment Note    CV Status:  Stable  Pain Score: 0    Pain management: adequate     Mental Status:  Alert and awake   Hydration Status:  Euvolemic   PONV Controlled:  Controlled   Airway Patency:  Patent      Post Op Vitals Reviewed: Yes      Staff: CRNA         No complications documented      /88   Temp      Pulse 87   Resp 12   SpO2 100

## 2021-04-26 NOTE — SEDATION DOCUMENTATION
Bilateral LE DVT lysis completed/initiated in IR by Dr Lydia Espinosa and Dr Cecilia Muro without complication under anesthesia care  Awaiting bed availability in PACU at present  Patient to eventually be transferred to ICU 8

## 2021-04-27 ENCOUNTER — ANESTHESIA (INPATIENT)
Dept: RADIOLOGY | Facility: HOSPITAL | Age: 36
DRG: 271 | End: 2021-04-27
Payer: COMMERCIAL

## 2021-04-27 ENCOUNTER — APPOINTMENT (INPATIENT)
Dept: RADIOLOGY | Facility: HOSPITAL | Age: 36
DRG: 271 | End: 2021-04-27
Payer: COMMERCIAL

## 2021-04-27 ENCOUNTER — ANESTHESIA EVENT (INPATIENT)
Dept: RADIOLOGY | Facility: HOSPITAL | Age: 36
DRG: 271 | End: 2021-04-27
Payer: COMMERCIAL

## 2021-04-27 LAB
ANION GAP SERPL CALCULATED.3IONS-SCNC: 8 MMOL/L (ref 4–13)
APTT PPP: 42 SECONDS (ref 23–37)
APTT PPP: 62 SECONDS (ref 23–37)
APTT PPP: 77 SECONDS (ref 23–37)
APTT PPP: 87 SECONDS (ref 23–37)
BASOPHILS # BLD AUTO: 0.06 THOUSANDS/ΜL (ref 0–0.1)
BASOPHILS # BLD AUTO: 0.08 THOUSANDS/ΜL (ref 0–0.1)
BASOPHILS NFR BLD AUTO: 1 % (ref 0–1)
BASOPHILS NFR BLD AUTO: 1 % (ref 0–1)
BUN SERPL-MCNC: 10 MG/DL (ref 5–25)
CALCIUM SERPL-MCNC: 9 MG/DL (ref 8.3–10.1)
CHLORIDE SERPL-SCNC: 106 MMOL/L (ref 100–108)
CO2 SERPL-SCNC: 24 MMOL/L (ref 21–32)
CREAT SERPL-MCNC: 1.02 MG/DL (ref 0.6–1.3)
EOSINOPHIL # BLD AUTO: 0.11 THOUSAND/ΜL (ref 0–0.61)
EOSINOPHIL # BLD AUTO: 0.13 THOUSAND/ΜL (ref 0–0.61)
EOSINOPHIL NFR BLD AUTO: 1 % (ref 0–6)
EOSINOPHIL NFR BLD AUTO: 1 % (ref 0–6)
ERYTHROCYTE [DISTWIDTH] IN BLOOD BY AUTOMATED COUNT: 13.3 % (ref 11.6–15.1)
ERYTHROCYTE [DISTWIDTH] IN BLOOD BY AUTOMATED COUNT: 13.4 % (ref 11.6–15.1)
ERYTHROCYTE [DISTWIDTH] IN BLOOD BY AUTOMATED COUNT: 13.7 % (ref 11.6–15.1)
FIBRINOGEN PPP-MCNC: 180 MG/DL (ref 227–495)
FIBRINOGEN PPP-MCNC: 60 MG/DL (ref 227–495)
FIBRINOGEN PPP-MCNC: 61 MG/DL (ref 227–495)
FIBRINOGEN PPP-MCNC: 95 MG/DL (ref 227–495)
GFR SERPL CREATININE-BSD FRML MDRD: 109 ML/MIN/1.73SQ M
GLUCOSE SERPL-MCNC: 105 MG/DL (ref 65–140)
HCT VFR BLD AUTO: 30.1 % (ref 36.5–49.3)
HCT VFR BLD AUTO: 31.8 % (ref 36.5–49.3)
HCT VFR BLD AUTO: 32.1 % (ref 36.5–49.3)
HCT VFR BLD AUTO: 32.7 % (ref 36.5–49.3)
HCT VFR BLD AUTO: 33.2 % (ref 36.5–49.3)
HGB BLD-MCNC: 10 G/DL (ref 12–17)
HGB BLD-MCNC: 10.1 G/DL (ref 12–17)
HGB BLD-MCNC: 10.3 G/DL (ref 12–17)
HGB BLD-MCNC: 9.3 G/DL (ref 12–17)
HGB BLD-MCNC: 9.8 G/DL (ref 12–17)
IMM GRANULOCYTES # BLD AUTO: 0.11 THOUSAND/UL (ref 0–0.2)
IMM GRANULOCYTES # BLD AUTO: 0.13 THOUSAND/UL (ref 0–0.2)
IMM GRANULOCYTES NFR BLD AUTO: 1 % (ref 0–2)
IMM GRANULOCYTES NFR BLD AUTO: 1 % (ref 0–2)
KCT BLD-ACNC: 127 SEC (ref 89–137)
KCT BLD-ACNC: 149 SEC (ref 89–137)
LYMPHOCYTES # BLD AUTO: 1.89 THOUSANDS/ΜL (ref 0.6–4.47)
LYMPHOCYTES # BLD AUTO: 2.35 THOUSANDS/ΜL (ref 0.6–4.47)
LYMPHOCYTES NFR BLD AUTO: 17 % (ref 14–44)
LYMPHOCYTES NFR BLD AUTO: 21 % (ref 14–44)
MCH RBC QN AUTO: 25.5 PG (ref 26.8–34.3)
MCH RBC QN AUTO: 25.5 PG (ref 26.8–34.3)
MCH RBC QN AUTO: 25.6 PG (ref 26.8–34.3)
MCH RBC QN AUTO: 25.7 PG (ref 26.8–34.3)
MCH RBC QN AUTO: 25.8 PG (ref 26.8–34.3)
MCHC RBC AUTO-ENTMCNC: 30.8 G/DL (ref 31.4–37.4)
MCHC RBC AUTO-ENTMCNC: 30.9 G/DL (ref 31.4–37.4)
MCHC RBC AUTO-ENTMCNC: 30.9 G/DL (ref 31.4–37.4)
MCHC RBC AUTO-ENTMCNC: 31 G/DL (ref 31.4–37.4)
MCHC RBC AUTO-ENTMCNC: 31.2 G/DL (ref 31.4–37.4)
MCV RBC AUTO: 82 FL (ref 82–98)
MCV RBC AUTO: 83 FL (ref 82–98)
MONOCYTES # BLD AUTO: 0.82 THOUSAND/ΜL (ref 0.17–1.22)
MONOCYTES # BLD AUTO: 0.86 THOUSAND/ΜL (ref 0.17–1.22)
MONOCYTES NFR BLD AUTO: 7 % (ref 4–12)
MONOCYTES NFR BLD AUTO: 8 % (ref 4–12)
NEUTROPHILS # BLD AUTO: 7.97 THOUSANDS/ΜL (ref 1.85–7.62)
NEUTROPHILS # BLD AUTO: 8.3 THOUSANDS/ΜL (ref 1.85–7.62)
NEUTS SEG NFR BLD AUTO: 68 % (ref 43–75)
NEUTS SEG NFR BLD AUTO: 73 % (ref 43–75)
NRBC BLD AUTO-RTO: 0 /100 WBCS
NRBC BLD AUTO-RTO: 0 /100 WBCS
PLATELET # BLD AUTO: 216 THOUSANDS/UL (ref 149–390)
PLATELET # BLD AUTO: 220 THOUSANDS/UL (ref 149–390)
PLATELET # BLD AUTO: 227 THOUSANDS/UL (ref 149–390)
PLATELET # BLD AUTO: 239 THOUSANDS/UL (ref 149–390)
PLATELET # BLD AUTO: 251 THOUSANDS/UL (ref 149–390)
PMV BLD AUTO: 10.1 FL (ref 8.9–12.7)
PMV BLD AUTO: 10.3 FL (ref 8.9–12.7)
PMV BLD AUTO: 10.4 FL (ref 8.9–12.7)
PMV BLD AUTO: 10.6 FL (ref 8.9–12.7)
PMV BLD AUTO: 10.7 FL (ref 8.9–12.7)
POTASSIUM SERPL-SCNC: 4.2 MMOL/L (ref 3.5–5.3)
RBC # BLD AUTO: 3.65 MILLION/UL (ref 3.88–5.62)
RBC # BLD AUTO: 3.82 MILLION/UL (ref 3.88–5.62)
RBC # BLD AUTO: 3.9 MILLION/UL (ref 3.88–5.62)
RBC # BLD AUTO: 3.96 MILLION/UL (ref 3.88–5.62)
RBC # BLD AUTO: 3.99 MILLION/UL (ref 3.88–5.62)
SODIUM SERPL-SCNC: 138 MMOL/L (ref 136–145)
SPECIMEN SOURCE: ABNORMAL
SPECIMEN SOURCE: NORMAL
WBC # BLD AUTO: 11.33 THOUSAND/UL (ref 4.31–10.16)
WBC # BLD AUTO: 11.48 THOUSAND/UL (ref 4.31–10.16)
WBC # BLD AUTO: 14.16 THOUSAND/UL (ref 4.31–10.16)
WBC # BLD AUTO: 15.85 THOUSAND/UL (ref 4.31–10.16)
WBC # BLD AUTO: 17.26 THOUSAND/UL (ref 4.31–10.16)

## 2021-04-27 PROCEDURE — 85384 FIBRINOGEN ACTIVITY: CPT | Performed by: STUDENT IN AN ORGANIZED HEALTH CARE EDUCATION/TRAINING PROGRAM

## 2021-04-27 PROCEDURE — 37252 INTRVASC US NONCORONARY 1ST: CPT

## 2021-04-27 PROCEDURE — 37248 TRLUML BALO ANGIOP 1ST VEIN: CPT

## 2021-04-27 PROCEDURE — 85025 COMPLETE CBC W/AUTO DIFF WBC: CPT | Performed by: EMERGENCY MEDICINE

## 2021-04-27 PROCEDURE — C1725 CATH, TRANSLUMIN NON-LASER: HCPCS

## 2021-04-27 PROCEDURE — 99291 CRITICAL CARE FIRST HOUR: CPT | Performed by: EMERGENCY MEDICINE

## 2021-04-27 PROCEDURE — 85027 COMPLETE CBC AUTOMATED: CPT | Performed by: SURGERY

## 2021-04-27 PROCEDURE — 85730 THROMBOPLASTIN TIME PARTIAL: CPT | Performed by: SURGERY

## 2021-04-27 PROCEDURE — 85384 FIBRINOGEN ACTIVITY: CPT | Performed by: SURGERY

## 2021-04-27 PROCEDURE — 37248 TRLUML BALO ANGIOP 1ST VEIN: CPT | Performed by: RADIOLOGY

## 2021-04-27 PROCEDURE — 85730 THROMBOPLASTIN TIME PARTIAL: CPT | Performed by: RADIOLOGY

## 2021-04-27 PROCEDURE — 76937 US GUIDE VASCULAR ACCESS: CPT

## 2021-04-27 PROCEDURE — 37252 INTRVASC US NONCORONARY 1ST: CPT | Performed by: RADIOLOGY

## 2021-04-27 PROCEDURE — 30233M1 TRANSFUSION OF NONAUTOLOGOUS PLASMA CRYOPRECIPITATE INTO PERIPHERAL VEIN, PERCUTANEOUS APPROACH: ICD-10-PCS | Performed by: EMERGENCY MEDICINE

## 2021-04-27 PROCEDURE — 99233 SBSQ HOSP IP/OBS HIGH 50: CPT | Performed by: SURGERY

## 2021-04-27 PROCEDURE — 85730 THROMBOPLASTIN TIME PARTIAL: CPT | Performed by: EMERGENCY MEDICINE

## 2021-04-27 PROCEDURE — 80048 BASIC METABOLIC PNL TOTAL CA: CPT | Performed by: SURGERY

## 2021-04-27 PROCEDURE — B5191ZZ FLUOROSCOPY OF INFERIOR VENA CAVA USING LOW OSMOLAR CONTRAST: ICD-10-PCS | Performed by: RADIOLOGY

## 2021-04-27 PROCEDURE — 85025 COMPLETE CBC W/AUTO DIFF WBC: CPT | Performed by: STUDENT IN AN ORGANIZED HEALTH CARE EDUCATION/TRAINING PROGRAM

## 2021-04-27 PROCEDURE — C1753 CATH, INTRAVAS ULTRASOUND: HCPCS

## 2021-04-27 PROCEDURE — 75820 VEIN X-RAY ARM/LEG: CPT

## 2021-04-27 PROCEDURE — 37214 CESSJ THERAPY CATH REMOVAL: CPT | Performed by: RADIOLOGY

## 2021-04-27 PROCEDURE — B51C1ZZ FLUOROSCOPY OF LEFT LOWER EXTREMITY VEINS USING LOW OSMOLAR CONTRAST: ICD-10-PCS | Performed by: RADIOLOGY

## 2021-04-27 PROCEDURE — 37249 TRLUML BALO ANGIOP ADDL VEIN: CPT | Performed by: RADIOLOGY

## 2021-04-27 PROCEDURE — C1769 GUIDE WIRE: HCPCS

## 2021-04-27 PROCEDURE — P9012 CRYOPRECIPITATE EACH UNIT: HCPCS

## 2021-04-27 PROCEDURE — 85384 FIBRINOGEN ACTIVITY: CPT | Performed by: EMERGENCY MEDICINE

## 2021-04-27 RX ORDER — FENTANYL CITRATE 50 UG/ML
INJECTION, SOLUTION INTRAMUSCULAR; INTRAVENOUS AS NEEDED
Status: DISCONTINUED | OUTPATIENT
Start: 2021-04-27 | End: 2021-04-27

## 2021-04-27 RX ORDER — HEPARIN SODIUM 1000 [USP'U]/ML
4800 INJECTION, SOLUTION INTRAVENOUS; SUBCUTANEOUS
Status: DISCONTINUED | OUTPATIENT
Start: 2021-04-27 | End: 2021-04-28

## 2021-04-27 RX ORDER — HEPARIN SODIUM 1000 [USP'U]/ML
9600 INJECTION, SOLUTION INTRAVENOUS; SUBCUTANEOUS
Status: DISCONTINUED | OUTPATIENT
Start: 2021-04-27 | End: 2021-04-28

## 2021-04-27 RX ORDER — DOCUSATE SODIUM 100 MG/1
100 CAPSULE, LIQUID FILLED ORAL 2 TIMES DAILY
Status: DISCONTINUED | OUTPATIENT
Start: 2021-04-27 | End: 2021-04-30 | Stop reason: HOSPADM

## 2021-04-27 RX ORDER — SENNOSIDES 8.6 MG
1 TABLET ORAL
Status: DISCONTINUED | OUTPATIENT
Start: 2021-04-27 | End: 2021-04-30 | Stop reason: HOSPADM

## 2021-04-27 RX ORDER — LIDOCAINE HYDROCHLORIDE 10 MG/ML
INJECTION, SOLUTION EPIDURAL; INFILTRATION; INTRACAUDAL; PERINEURAL AS NEEDED
Status: DISCONTINUED | OUTPATIENT
Start: 2021-04-27 | End: 2021-04-27

## 2021-04-27 RX ORDER — DEXMEDETOMIDINE HYDROCHLORIDE 100 UG/ML
INJECTION, SOLUTION INTRAVENOUS AS NEEDED
Status: DISCONTINUED | OUTPATIENT
Start: 2021-04-27 | End: 2021-04-27

## 2021-04-27 RX ORDER — HEPARIN SODIUM 1000 [USP'U]/ML
INJECTION, SOLUTION INTRAVENOUS; SUBCUTANEOUS AS NEEDED
Status: DISCONTINUED | OUTPATIENT
Start: 2021-04-27 | End: 2021-04-27

## 2021-04-27 RX ORDER — PROPOFOL 10 MG/ML
INJECTION, EMULSION INTRAVENOUS CONTINUOUS PRN
Status: DISCONTINUED | OUTPATIENT
Start: 2021-04-27 | End: 2021-04-27

## 2021-04-27 RX ORDER — PROPOFOL 10 MG/ML
INJECTION, EMULSION INTRAVENOUS AS NEEDED
Status: DISCONTINUED | OUTPATIENT
Start: 2021-04-27 | End: 2021-04-27

## 2021-04-27 RX ORDER — ONDANSETRON 2 MG/ML
INJECTION INTRAMUSCULAR; INTRAVENOUS AS NEEDED
Status: DISCONTINUED | OUTPATIENT
Start: 2021-04-27 | End: 2021-04-27

## 2021-04-27 RX ORDER — POLYETHYLENE GLYCOL 3350 17 G/17G
17 POWDER, FOR SOLUTION ORAL DAILY PRN
Status: DISCONTINUED | OUTPATIENT
Start: 2021-04-27 | End: 2021-04-30 | Stop reason: HOSPADM

## 2021-04-27 RX ORDER — MIDAZOLAM HYDROCHLORIDE 2 MG/2ML
INJECTION, SOLUTION INTRAMUSCULAR; INTRAVENOUS AS NEEDED
Status: DISCONTINUED | OUTPATIENT
Start: 2021-04-27 | End: 2021-04-27

## 2021-04-27 RX ORDER — SODIUM CHLORIDE 9 MG/ML
INJECTION, SOLUTION INTRAVENOUS CONTINUOUS PRN
Status: DISCONTINUED | OUTPATIENT
Start: 2021-04-27 | End: 2021-04-27

## 2021-04-27 RX ORDER — HEPARIN SODIUM 10000 [USP'U]/100ML
3-30 INJECTION, SOLUTION INTRAVENOUS
Status: DISCONTINUED | OUTPATIENT
Start: 2021-04-27 | End: 2021-04-28

## 2021-04-27 RX ADMIN — OXYCODONE HYDROCHLORIDE 5 MG: 5 TABLET ORAL at 17:43

## 2021-04-27 RX ADMIN — HEPARIN SODIUM 5000 UNITS: 1000 INJECTION INTRAVENOUS; SUBCUTANEOUS at 15:28

## 2021-04-27 RX ADMIN — MIDAZOLAM 1 MG: 1 INJECTION INTRAMUSCULAR; INTRAVENOUS at 15:09

## 2021-04-27 RX ADMIN — HYDROMORPHONE HYDROCHLORIDE 0.2 MG: 0.2 INJECTION, SOLUTION INTRAMUSCULAR; INTRAVENOUS; SUBCUTANEOUS at 11:18

## 2021-04-27 RX ADMIN — PROPOFOL 30 MG: 10 INJECTION, EMULSION INTRAVENOUS at 15:19

## 2021-04-27 RX ADMIN — ONDANSETRON 4 MG: 2 INJECTION INTRAMUSCULAR; INTRAVENOUS at 16:14

## 2021-04-27 RX ADMIN — MIDAZOLAM 1 MG: 1 INJECTION INTRAMUSCULAR; INTRAVENOUS at 15:19

## 2021-04-27 RX ADMIN — ALTEPLASE 0.25 MG/HR: 2.2 INJECTION, POWDER, LYOPHILIZED, FOR SOLUTION INTRAVENOUS at 13:35

## 2021-04-27 RX ADMIN — HEPARIN SODIUM 18 UNITS/KG/HR: 10000 INJECTION, SOLUTION INTRAVENOUS at 17:28

## 2021-04-27 RX ADMIN — FENTANYL CITRATE 50 MCG: 50 INJECTION INTRAMUSCULAR; INTRAVENOUS at 15:11

## 2021-04-27 RX ADMIN — DOCUSATE SODIUM 100 MG: 100 CAPSULE, LIQUID FILLED ORAL at 11:17

## 2021-04-27 RX ADMIN — HYDROMORPHONE HYDROCHLORIDE 0.2 MG: 0.2 INJECTION, SOLUTION INTRAMUSCULAR; INTRAVENOUS; SUBCUTANEOUS at 03:43

## 2021-04-27 RX ADMIN — OXYCODONE HYDROCHLORIDE 5 MG: 5 TABLET ORAL at 10:13

## 2021-04-27 RX ADMIN — ALTEPLASE 0.5 MG/HR: 2.2 INJECTION, POWDER, LYOPHILIZED, FOR SOLUTION INTRAVENOUS at 03:36

## 2021-04-27 RX ADMIN — PROPOFOL 100 MCG/KG/MIN: 10 INJECTION, EMULSION INTRAVENOUS at 15:11

## 2021-04-27 RX ADMIN — LIDOCAINE HYDROCHLORIDE 50 MG: 10 INJECTION, SOLUTION EPIDURAL; INFILTRATION; INTRACAUDAL; PERINEURAL at 15:11

## 2021-04-27 RX ADMIN — SODIUM CHLORIDE 0.4 MCG/KG/HR: 9 INJECTION, SOLUTION INTRAVENOUS at 15:22

## 2021-04-27 RX ADMIN — FENTANYL CITRATE 50 MCG: 50 INJECTION INTRAMUSCULAR; INTRAVENOUS at 15:19

## 2021-04-27 RX ADMIN — HEPARIN SODIUM 18 UNITS/KG/HR: 10000 INJECTION, SOLUTION INTRAVENOUS at 20:29

## 2021-04-27 RX ADMIN — ALTEPLASE 0.25 MG/HR: 2.2 INJECTION, POWDER, LYOPHILIZED, FOR SOLUTION INTRAVENOUS at 13:38

## 2021-04-27 RX ADMIN — IOHEXOL 75 ML: 350 INJECTION, SOLUTION INTRAVENOUS at 16:55

## 2021-04-27 RX ADMIN — ALTEPLASE 0.5 MG/HR: 2.2 INJECTION, POWDER, LYOPHILIZED, FOR SOLUTION INTRAVENOUS at 03:35

## 2021-04-27 RX ADMIN — SODIUM CHLORIDE: 0.9 INJECTION, SOLUTION INTRAVENOUS at 15:09

## 2021-04-27 RX ADMIN — DEXMEDETOMIDINE HCL 12 MCG: 100 INJECTION INTRAVENOUS at 15:19

## 2021-04-27 RX ADMIN — OXYCODONE HYDROCHLORIDE 5 MG: 5 TABLET ORAL at 03:13

## 2021-04-27 RX ADMIN — HYDROMORPHONE HYDROCHLORIDE 0.2 MG: 0.2 INJECTION, SOLUTION INTRAMUSCULAR; INTRAVENOUS; SUBCUTANEOUS at 19:03

## 2021-04-27 NOTE — ANESTHESIA PREPROCEDURE EVALUATION
Procedure:  IR TPA LYSIS CHECK    Relevant Problems   CARDIO   (+) Iliocaval venous thrombosis      HEMATOLOGY   (+) Anemia      Other   (+) History of partial colectomy      Lab Results   Component Value Date    WBC 14 16 (H) 04/27/2021    HGB 10 0 (L) 04/27/2021    HCT 32 1 (L) 04/27/2021    MCV 82 04/27/2021     04/27/2021     Lab Results   Component Value Date    K 4 2 04/27/2021    CO2 24 04/27/2021     04/27/2021    BUN 10 04/27/2021    CREATININE 1 02 04/27/2021     Lab Results   Component Value Date    INR 1 23 (H) 04/23/2021    PROTIME 15 3 (H) 04/23/2021     Lab Results   Component Value Date    PTT 42 (H) 04/27/2021       No results found for: GLUCOSE    No results found for: HGBA1C    Type and Screen:  B    Physical Exam    Airway    Mallampati score: II  TM Distance: >3 FB  Neck ROM: full     Dental       Cardiovascular      Pulmonary      Other Findings        Anesthesia Plan  ASA Score- 3     Anesthesia Type- IV sedation with anesthesia with ASA Monitors  Additional Monitors:   Airway Plan:     Comment: IV sedation with GETA as backup  Prone position  Plan Factors-Exercise tolerance (METS): >4 METS  Chart reviewed  Existing labs reviewed  Patient summary reviewed  Induction- intravenous  Postoperative Plan-     Informed Consent- Anesthetic plan and risks discussed with patient  I personally reviewed this patient with the CRNA  Discussed and agreed on the Anesthesia Plan with the CRNA  Ko Arnold
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
## 2021-04-27 NOTE — BRIEF OP NOTE (RAD/CATH)
INTERVENTIONAL RADIOLOGY PROCEDURE NOTE    Date: 4/27/2021    Procedure: IR TPA LYSIS CHECK    Preoperative diagnosis:   1  Acute deep vein thrombosis (DVT) of left lower extremity, unspecified vein (HCC)    2  Iliocaval venous thrombosis         Postoperative diagnosis: Same  Surgeon: Jeff Phelps MD     Assistant: None  No qualified resident was available  Blood loss:  minimal    Specimens:  none     Findings:  Near complete resolution of ileal femoral thrombus  Evidence of prior left leg DVT with chronic changes seen and multiple venous channels in the thigh  Diffusely narrowed left iliac system treated with 12 mm angioplasty with no focal residual stenosis  Possibly related to prior DVT  No evidence of May-Thurner by IVUS  Okay to restart full-dose heparin immediately and transition to oral anticoagulant  Follow-up IR clinic in 3-4 weeks to discuss filter removal and assess need for further iliac intervention  Complications: None immediate      Anesthesia: MAC sedation
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
## 2021-04-27 NOTE — PHYSICAL THERAPY NOTE
Physical Therapy Cancellation Note    PT orders received chart review completed  Pt is currently off the floor at IR and not appropriate to participate in skilled PT at this time  PT will follow and eval as medically appropriate       04/27/21 1500   Note Type   Cancel Reasons Patient to operating room     Jerome Pierre, PT

## 2021-04-27 NOTE — QUICK NOTE
Post-Op Check    Subjective:   Patient went for IR recheck of ileal femoral thrombus  Near complete resolution of thrombus noted  Patient had lower extremity venous sheaths removed  Patient cleared to restart full dose heparin immediately with transition to oral anticoagulation  Patient to remain flat until 9PM tonight  Currently complaining of low back discomfort from lying flat on the IR table  Denies other symptoms at this time  Objective:   /89   Pulse 74   Temp 98 6 °F (37 °C) (Oral)   Resp 12   Ht 6' (1 829 m)   Wt 120 kg (264 lb)   SpO2 100%   BMI 35 80 kg/m²     Physical Exam  Vitals signs and nursing note reviewed  Constitutional:       General: He is not in acute distress  Appearance: He is not ill-appearing  HENT:      Head: Normocephalic and atraumatic  Cardiovascular:      Rate and Rhythm: Normal rate and regular rhythm  Pulses:           Dorsalis pedis pulses are 2+ on the right side and 2+ on the left side  Pulmonary:      Effort: Pulmonary effort is normal  No respiratory distress  Abdominal:      Palpations: Abdomen is soft  Tenderness: There is no abdominal tenderness  Skin:     General: Skin is warm and dry  Comments: Sheath sites in dressings, no active bleeding noted   Neurological:      General: No focal deficit present  Mental Status: He is alert and oriented to person, place, and time  Assessment /Plan:  DVT/Iliocaval venous thrombosis: almost completely resolved  - BL venous sheaths removed by IR today  - Patient placed on heparin drip post-op  - Plan to keep on heparin drip, repeat labs, and monitor for signs of bleeding  - Can transfer to med surg if stable later tonight

## 2021-04-27 NOTE — SEDATION DOCUMENTATION
Bilateral LE venous lysis recheck/intervention completed in IR by Dr Pedro Briseno under anesthesia care  Bilateral popliteal sites with gauze/tegaderm CDI after manual pressure held to achieve hemostasis  Bedrest start time 1700 with legs straight  Report given at bedside to ICU RN

## 2021-04-27 NOTE — ANESTHESIA POSTPROCEDURE EVALUATION
Post-Op Assessment Note    CV Status:  Stable    Pain management: adequate     Mental Status:  Alert and awake   Hydration Status:  Euvolemic   PONV Controlled:  Controlled   Airway Patency:  Patent      Post Op Vitals Reviewed: Yes      Staff: CRNA   Comments: vss report rn        No complications documented      BP   123/62   Temp      Pulse  77   Resp      SpO2   98 RA

## 2021-04-27 NOTE — OCCUPATIONAL THERAPY NOTE
OT CANCEL NOTE    OT orders received  Chart reviewed  Pt is currently on active bedrest  Will hold initial OT evaluation  Will continue to follow pt on caseload and see pt when medically stable and as clinically appropriate         04/27/21 0906   Note Type   Cancel Reasons Medical status       Sherrill Danielle MS, OTR/L
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
## 2021-04-28 PROBLEM — N39.0 UTI (URINARY TRACT INFECTION): Status: RESOLVED | Noted: 2021-04-24 | Resolved: 2021-04-28

## 2021-04-28 PROBLEM — I82.90 VENOUS THROMBOSIS: Status: RESOLVED | Noted: 2021-04-24 | Resolved: 2021-04-28

## 2021-04-28 LAB
ANION GAP SERPL CALCULATED.3IONS-SCNC: 1 MMOL/L (ref 4–13)
APTT PPP: 125 SECONDS (ref 23–37)
APTT PPP: 83 SECONDS (ref 23–37)
APTT PPP: 86 SECONDS (ref 23–37)
ATRIAL RATE: 94 BPM
BASOPHILS # BLD AUTO: 0.06 THOUSANDS/ΜL (ref 0–0.1)
BASOPHILS NFR BLD AUTO: 1 % (ref 0–1)
BUN SERPL-MCNC: 14 MG/DL (ref 5–25)
CALCIUM SERPL-MCNC: 8.9 MG/DL (ref 8.3–10.1)
CHLORIDE SERPL-SCNC: 109 MMOL/L (ref 100–108)
CO2 SERPL-SCNC: 29 MMOL/L (ref 21–32)
CREAT SERPL-MCNC: 1.19 MG/DL (ref 0.6–1.3)
EOSINOPHIL # BLD AUTO: 0.13 THOUSAND/ΜL (ref 0–0.61)
EOSINOPHIL NFR BLD AUTO: 1 % (ref 0–6)
ERYTHROCYTE [DISTWIDTH] IN BLOOD BY AUTOMATED COUNT: 13.8 % (ref 11.6–15.1)
FIBRINOGEN PPP-MCNC: 115 MG/DL (ref 227–495)
GFR SERPL CREATININE-BSD FRML MDRD: 90 ML/MIN/1.73SQ M
GLUCOSE SERPL-MCNC: 141 MG/DL (ref 65–140)
HCT VFR BLD AUTO: 30.8 % (ref 36.5–49.3)
HGB BLD-MCNC: 9.4 G/DL (ref 12–17)
IMM GRANULOCYTES # BLD AUTO: 0.16 THOUSAND/UL (ref 0–0.2)
IMM GRANULOCYTES NFR BLD AUTO: 2 % (ref 0–2)
LYMPHOCYTES # BLD AUTO: 1.91 THOUSANDS/ΜL (ref 0.6–4.47)
LYMPHOCYTES NFR BLD AUTO: 18 % (ref 14–44)
MAGNESIUM SERPL-MCNC: 2.5 MG/DL (ref 1.6–2.6)
MCH RBC QN AUTO: 25.4 PG (ref 26.8–34.3)
MCHC RBC AUTO-ENTMCNC: 30.5 G/DL (ref 31.4–37.4)
MCV RBC AUTO: 83 FL (ref 82–98)
MONOCYTES # BLD AUTO: 0.5 THOUSAND/ΜL (ref 0.17–1.22)
MONOCYTES NFR BLD AUTO: 5 % (ref 4–12)
NEUTROPHILS # BLD AUTO: 7.72 THOUSANDS/ΜL (ref 1.85–7.62)
NEUTS SEG NFR BLD AUTO: 73 % (ref 43–75)
NRBC BLD AUTO-RTO: 0 /100 WBCS
P AXIS: 70 DEGREES
PHOSPHATE SERPL-MCNC: 2.3 MG/DL (ref 2.7–4.5)
PLATELET # BLD AUTO: 245 THOUSANDS/UL (ref 149–390)
PMV BLD AUTO: 11.4 FL (ref 8.9–12.7)
POTASSIUM SERPL-SCNC: 4.1 MMOL/L (ref 3.5–5.3)
PR INTERVAL: 121 MS
QRS AXIS: 47 DEGREES
QRSD INTERVAL: 92 MS
QT INTERVAL: 329 MS
QTC INTERVAL: 412 MS
RBC # BLD AUTO: 3.7 MILLION/UL (ref 3.88–5.62)
SODIUM SERPL-SCNC: 139 MMOL/L (ref 136–145)
T WAVE AXIS: -14 DEGREES
TROPONIN I SERPL-MCNC: <0.02 NG/ML
VENTRICULAR RATE: 94 BPM
WBC # BLD AUTO: 10.48 THOUSAND/UL (ref 4.31–10.16)

## 2021-04-28 PROCEDURE — 84484 ASSAY OF TROPONIN QUANT: CPT | Performed by: EMERGENCY MEDICINE

## 2021-04-28 PROCEDURE — NC001 PR NO CHARGE: Performed by: HOSPITALIST

## 2021-04-28 PROCEDURE — 84100 ASSAY OF PHOSPHORUS: CPT | Performed by: INTERNAL MEDICINE

## 2021-04-28 PROCEDURE — 85025 COMPLETE CBC W/AUTO DIFF WBC: CPT | Performed by: INTERNAL MEDICINE

## 2021-04-28 PROCEDURE — 93308 TTE F-UP OR LMTD: CPT | Performed by: EMERGENCY MEDICINE

## 2021-04-28 PROCEDURE — 83735 ASSAY OF MAGNESIUM: CPT | Performed by: INTERNAL MEDICINE

## 2021-04-28 PROCEDURE — 97163 PT EVAL HIGH COMPLEX 45 MIN: CPT

## 2021-04-28 PROCEDURE — 85730 THROMBOPLASTIN TIME PARTIAL: CPT | Performed by: SURGERY

## 2021-04-28 PROCEDURE — 93010 ELECTROCARDIOGRAM REPORT: CPT | Performed by: INTERNAL MEDICINE

## 2021-04-28 PROCEDURE — 99233 SBSQ HOSP IP/OBS HIGH 50: CPT | Performed by: EMERGENCY MEDICINE

## 2021-04-28 PROCEDURE — 97166 OT EVAL MOD COMPLEX 45 MIN: CPT

## 2021-04-28 PROCEDURE — 85730 THROMBOPLASTIN TIME PARTIAL: CPT | Performed by: STUDENT IN AN ORGANIZED HEALTH CARE EDUCATION/TRAINING PROGRAM

## 2021-04-28 PROCEDURE — 80048 BASIC METABOLIC PNL TOTAL CA: CPT | Performed by: INTERNAL MEDICINE

## 2021-04-28 PROCEDURE — NC001 PR NO CHARGE: Performed by: SURGERY

## 2021-04-28 PROCEDURE — 93005 ELECTROCARDIOGRAM TRACING: CPT

## 2021-04-28 RX ORDER — ACETAMINOPHEN 325 MG/1
650 TABLET ORAL EVERY 6 HOURS PRN
Refills: 0 | Status: CANCELLED
Start: 2021-04-28

## 2021-04-28 RX ORDER — LIDOCAINE 50 MG/G
1 PATCH TOPICAL ONCE
Status: COMPLETED | OUTPATIENT
Start: 2021-04-28 | End: 2021-04-29

## 2021-04-28 RX ADMIN — SENNOSIDES 8.6 MG: 8.6 TABLET, FILM COATED ORAL at 20:53

## 2021-04-28 RX ADMIN — TOPICAL ANESTHETIC 2 SPRAY: 200 SPRAY DENTAL; PERIODONTAL at 09:46

## 2021-04-28 RX ADMIN — LIDOCAINE 1 PATCH: 50 PATCH TOPICAL at 12:03

## 2021-04-28 RX ADMIN — OXYCODONE HYDROCHLORIDE 5 MG: 5 TABLET ORAL at 20:52

## 2021-04-28 RX ADMIN — HEPARIN SODIUM 16 UNITS/KG/HR: 10000 INJECTION, SOLUTION INTRAVENOUS at 11:58

## 2021-04-28 RX ADMIN — RIVAROXABAN 15 MG: 15 TABLET, FILM COATED ORAL at 16:56

## 2021-04-28 RX ADMIN — DOCUSATE SODIUM 100 MG: 100 CAPSULE, LIQUID FILLED ORAL at 17:04

## 2021-04-28 RX ADMIN — DOCUSATE SODIUM 100 MG: 100 CAPSULE, LIQUID FILLED ORAL at 09:46

## 2021-04-28 NOTE — ASSESSMENT & PLAN NOTE
- s/p mechanical thrombectomy and tPA lysis by IR   - fibrinogen low yesterday, patient given cryo overnight   - repeat fibrinogen pending, consider another unit of cryo if <150  - BL LE sheaths removed yesterday, continue to monitor sites for signs of bleeding or infection   - continue heparin drip with aPTT checks  - on IR recheck yesterday, appears improved  - serial neurovascular checks of BL LE  - PRN analgesia (tylenol, oxycodone, dilaudid)

## 2021-04-28 NOTE — PLAN OF CARE
Problem: PHYSICAL THERAPY ADULT  Goal: Performs mobility at highest level of function for planned discharge setting  See evaluation for individualized goals  Description: Treatment/Interventions: Functional transfer training, LE strengthening/ROM, Therapeutic exercise, Endurance training, Elevations, Bed mobility, Gait training  Equipment Recommended: (TBD)       See flowsheet documentation for full assessment, interventions and recommendations  Note: Prognosis: Good  Problem List: Decreased strength, Decreased endurance, Impaired balance, Decreased mobility  Assessment: Pt is a 39 yo male admitted to [place] [address] on 4/24/2021 s/p L leg swelling  Pt had a procedure on 4/26 for thrombolysis/thrombectomy iliac/IVC with venogram  Pt had an other procedure on 4/27 for IR TPA lysis check  Dx: iliocaval venous thrombosis, UTI, hx of partial colectomy, anemia  Two patient identifiers were used to confirm  Pt livs in a [facility] with 1 VIDYA  Pt lives with his family  Pt works full time  Pt was I for ADL's and mobility prior  Pt drives  Pt will need to negotiate steps to get to his office  Pt's impairments include reduced mobility, reduced ROM on L LE, hx of injury on L LE, reduced activity tolerance, gait abnormalities, risk of falling  These impairments limit the ability of the patient to perform mobility without increased assistance, return to PLOF and participate in everyday life activities  Pt would benefit from continued skilled therapy while in the hospital to improve overall mobility and work towards a safe d/c  Recommend discharge to rehab  At the end of the session the patient was left in seated position with call bell and phone within reach  PT encouraged to ambulate multiple times a day with assistance  Pt also educated about ambulating multiple times a day when working at home to encourage mobility     Barriers to Discharge: Inaccessible home environment        PT Discharge Recommendation: Home with outpatient rehabilitation     PT - OK to Discharge: No    See flowsheet documentation for full assessment

## 2021-04-28 NOTE — ASSESSMENT & PLAN NOTE
- Hgb 12 6 on admission, per chart review persistent anemia w/ baseline 8-9  - Hgb currently stable at 9 3 on heparin drip   - continue to monitor with daily CBC

## 2021-04-28 NOTE — UTILIZATION REVIEW
Continued Stay Review    Date: 04-28-21                         Current Patient Class: inpatient  Current Level of Care: medical     HPI:35 y o  male initially admitted on *04-24-21 for Iliocaval venous thrombosis  Assessment/Plan: 02-27-21   IR recheck of ileal femoral thrombus  Near complete resolution of thrombus noted  Patient had lower extremity venous sheaths removed  s/p mechanical thrombectomy and tPA lysis by IR fibrinogen low yesterday, patient given cryo overnight repeat fibrinogen pending, consider another unit of cryo if <150 BL LE sheaths removed yesterday, continue to monitor sites for signs of bleeding or infection continue heparin drip with aPTT checks On exam  Compartments of BL LE soft, nontender  LLE mildly swollen compared to the right  Sheath sites dry, no active bleeding or drainage    Starting to transition to PO anticoagulation      Vital Signs:   Date/Time  Temp  Pulse  Resp  BP  MAP (mmHg)  SpO2  O2 Flow Rate (L/min)  O2 Device  Patient Position - Orthostatic VS   04/28/21 0700  --  84  12  117/72  86  --  --  --  --   04/28/21 0600  --  74  21  121/67  83  --  --  --  --   04/28/21 0500  --  74  18  119/57  75  97 %  --  --  --   04/28/21 0400  98 1 °F (36 7 °C)  78  21  126/65  81  --  --  --  --   04/28/21 0300  --  82  14  115/66  82  98 %  --  --  --   04/28/21 0200  --  80  23Abnormal   124/63  83  97 %  --  --  --   04/28/21 0100  98 2 °F (36 8 °C)  86  23Abnormal   109/62  77  --  --  --  --   04/28/21 0000  --  86  22  111/67  80  96 %  --  --  --   04/27/21 2300  --  88  22  128/71  87  98 %  --  --  --   04/27/21 2200  --  92  14  133/69  88  --  --  --  --   04/27/21 2117  98 2 °F (36 8 °C)  92  14  124/63  89  97 %  --  None (Room air)  --   04/27/21 2104  --  92  18  113/64  86  --  --  --  --   04/27/21 2100  --  90  20  133/64  86  --  --  --  --   04/27/21 2034  --  96  10Abnormal   124/67  85  --  --  --  --   04/27/21 2012  98 1 °F (36 7 °C)  92  14  130/65  88  100 %  --  None (Room air)  Lying   04/27/21 2004  --  88  10Abnormal   134/63  87  --  --  --  --   04/27/21 2000 --  92  14  --  --  --  --  --  --   04/27/21 1958  98 2 °F (36 8 °C)  94  17  134/63  --  100 %  --  None (Room air)  Lying   04/27/21 1934  --  96  18  121/75  90  --  --  --  --   04/27/21 1919  --  90  15  119/76  94  --  --  --  --   04/27/21 1900  --  96  14  122/62  82  --  --  --  --   04/27/21 1845  --  88  17  121/69  84  --  --             Pertinent Labs/Diagnostic Results:   Results from last 7 days   Lab Units 04/23/21 2015   SARS-COV-2  Negative     Results from last 7 days   Lab Units 04/28/21  0945 04/27/21  2330 04/27/21  1811 04/27/21  1208 04/27/21  0459   WBC Thousand/uL 10 48* 11 33* 11 48* 14 16* 15 85*   HEMOGLOBIN g/dL 9 4* 9 3* 9 8* 10 0* 10 1*   HEMATOCRIT % 30 8* 30 1* 31 8* 32 1* 32 7*   PLATELETS Thousands/uL 245 220 239 216 227   NEUTROS ABS Thousands/µL 7 72* 8 30* 7 97*  --   --      Results from last 7 days   Lab Units 04/26/21  0447   RETIC CT ABS  37,800   RETIC CT PCT % 1 00     Results from last 7 days   Lab Units 04/28/21  0945 04/27/21  0459 04/25/21  0446 04/24/21  0634 04/23/21 2001   SODIUM mmol/L 139 138 138 140 139   POTASSIUM mmol/L 4 1 4 2 3 8 3 7 3 8   CHLORIDE mmol/L 109* 106 107 109* 102   CO2 mmol/L 29 24 26 26 28   ANION GAP mmol/L 1* 8 5 5 9   BUN mg/dL 14 10 10 9 10   CREATININE mg/dL 1 19 1 02 0 97 1 08 1 19   EGFR ml/min/1 73sq m 90 109 116 102 90   CALCIUM mg/dL 8 9 9 0 9 2 9 3 9 1   MAGNESIUM mg/dL 2 5  --   --   --   --    PHOSPHORUS mg/dL 2 3*  --   --   --   --      Results from last 7 days   Lab Units 04/23/21 2001   AST U/L 17   ALT U/L 44   ALK PHOS U/L 89   TOTAL PROTEIN g/dL 8 8*   ALBUMIN g/dL 3 3*   TOTAL BILIRUBIN mg/dL 0 40         Results from last 7 days   Lab Units 04/28/21  0945 04/27/21  0459 04/25/21  0446 04/24/21  0634 04/23/21 2001   GLUCOSE RANDOM mg/dL 141* 105 95 88 95     Results from last 7 days   Lab Units 04/28/21  0945 04/23/21 2001   TROPONIN I ng/mL <0 02 <0 02         Results from last 7 days   Lab Units 04/28/21  0746 04/27/21  2330 04/27/21  1811  04/23/21 2001   PROTIME seconds  --   --   --   --  15 3*   INR   --   --   --   --  1 23*   PTT seconds 86* 125* 87*   < > 28    < > = values in this interval not displayed  Results from last 7 days   Lab Units 04/24/21  0634   FERRITIN ng/mL 239       Results from last 7 days   Lab Units 04/23/21  2308   CLARITY UA  Clear   COLOR UA  Fátima   SPEC GRAV UA  <=1 005   PH UA  5 0   GLUCOSE UA mg/dl Negative   KETONES UA mg/dl Trace*   BLOOD UA  Negative   PROTEIN UA mg/dl Trace*   NITRITE UA  Negative   BILIRUBIN UA  Interference- unable to analyze*   UROBILINOGEN UA E U /dl 1 0   LEUKOCYTES UA  Negative   WBC UA /hpf 10-20*   RBC UA /hpf None Seen   BACTERIA UA /hpf Occasional   EPITHELIAL CELLS WET PREP /hpf Moderate*   MUCUS THREADS  Occasional*       Results from last 7 days   Lab Units 04/23/21  2308   URINE CULTURE  No Growth <1000 cfu/mL     IR TPA  04-27-21  Near complete resolution of ileal femoral thrombus   Evidence of prior left leg DVT with chronic changes seen and multiple venous channels in the thigh   Diffusely narrowed left iliac system treated with 12 mm angioplasty with no focal residual stenosis     IR DVT thrombolysis/thrombectomy iliac/ivc with venogram  04-27-21    Ileal caval thrombosis and left leg DVT  Mechanical thrombectomy without flow restoration  TPA infusion started with bilateral infusion catheters         Medications:   Scheduled Medications:  docusate sodium, 100 mg, Oral, BID  lidocaine, 1 patch, Topical, Once  senna, 1 tablet, Oral, HS      Continuous IV Infusions:  heparin (porcine), 3-30 Units/kg/hr (Order-Specific), Intravenous, Titrated      PRN Meds:  acetaminophen, 650 mg, Oral, Q6H PRN  heparin (porcine), 4,800 Units, Intravenous, Q1H PRN  heparin (porcine), 9,600 Units, Intravenous, Q1H PRN  oxyCODONE, 2 5 mg, Oral, Q6H PRN    Or  oxyCODONE, 5 mg, Oral, Q6H PRN  polyethylene glycol, 17 g, Oral, Daily PRN        Discharge Plan: TBD    Network Utilization Review Department  ATTENTION: Please call with any questions or concerns to [phone] and carefully listen to the prompts so that you are directed to the right person  All voicemails are confidential   Mattie Lynn all requests for admission clinical reviews, approved or denied determinations and any other requests to dedicated fax number below belonging to the campus where the patient is receiving treatment   List of dedicated fax numbers for the Facilities:  [address] DENIALS (Administrative/Medical Necessity) [phone]   [address] (Maternity/NICU/Pediatrics) [phone]   [address] Muna Silva [phone] [place] [address] [address] Carmen Cohen [address] [facility] [place] [address] [phone]

## 2021-04-28 NOTE — CASE MANAGEMENT
CM asked by SOD resident to complete [facility] pharmacy pricing check for Eliquis and Xarelto for pt  CM called [facility] and confirmed with pt's insurance that pricing for both would be $0 copay  CM informed resident of the same  Pt expected to transfer to medical floor today

## 2021-04-28 NOTE — PHYSICAL THERAPY NOTE
Physical Therapy Evaluation Note     Patient Name: Keo ERAZO Date: 4/28/2021     Problem List  Active Problems:    History of partial colectomy    Anemia       Past Medical History  Past Medical History:   Diagnosis Date    Deep vein thrombosis (DVT) (HCC)     Diverticulitis         Past Surgical History  Past Surgical History:   Procedure Laterality Date    COLON SURGERY      FEMUR SURGERY      IR DVT THROMBOLYSIS/THROMBECTOMY ILIAC/IVC WITH VENOGRAM  4/26/2021    IR TPA LYSIS CHECK  4/27/2021    KNEE SURGERY Bilateral     SHOULDER SURGERY Left       04/28/21 0858   PT Last Visit   PT Visit Date 04/28/21   Note Type   Note type Evaluation   Pain Assessment   Pain Assessment Tool 0-10   Pain Score 7   Pain Location/Orientation Location: Generalized   Home Living   Type of [address] Two level   Additional Comments Pt lives in a [facility] with 1 VIDYA  Pt lives with his family  Pt was I for ADL's and mobility prior  Pt works from home  Pt has a full flight between floor and needs to negotiate for his office and bedroom      Prior Function   Level of [place] Independent with ADLs and functional mobility   Lives With [facility] Help From Family   ADL Assistance Independent   IADLs Independent   Falls in the last 6 months 0   Vocational Full time employment   Restrictions/Precautions   Weight Bearing Precautions Per Order No   Cognition   Overall Cognitive Status WFL   Arousal/Participation Responsive   Attention Within functional limits   Orientation Level Oriented X4   Memory Within functional limits   Following Commands Follows all commands and directions without difficulty   RLE Assessment   RLE Assessment WFL   LLE Assessment   LLE Assessment   (limited ROM of knee flexion> knee extension)   Coordination   Sensation WFL   Light Touch   RLE Light Touch Grossly intact   LLE Light Touch Grossly intact   Transfers   Sit to Stand 5  Supervision   Stand to Sit 5  Supervision   Ambulation/Elevation   Gait pattern Excessively slow; Step to; Foward flexed; Improper Weight shift; Shuffling  (step to progressing to step through with cueing and increase)   Gait Assistance 4  Minimal assist   Additional items Assist x 1   Assistive Device Rolling walker   Distance [id number]   Balance   Static Sitting Good   Dynamic Sitting Fair +   Static Standing Fair   Dynamic Standing Fair   Ambulatory Fair -   Endurance Deficit   Endurance Deficit Yes   Activity Tolerance   Activity Tolerance Patient limited by fatigue   Medical Staff Made Aware OT   Nurse Made Aware nurse approved therapy session   Assessment   Prognosis Good   Problem List Decreased strength;Decreased endurance; Impaired balance;Decreased mobility   Assessment Pt is a 39 yo male admitted to [place] [address] on 4/24/2021 s/p L leg swelling  Pt had a procedure on 4/26 for thrombolysis/thrombectomy iliac/IVC with venogram  Pt had an other procedure on 4/27 for IR TPA lysis check  Dx: iliocaval venous thrombosis, UTI, hx of partial colectomy, anemia  Two patient identifiers were used to confirm  Pt livs in a [address] with 1 VIDYA  Pt lives with his family  Pt works full time  Pt was I for ADL's and mobility prior  Pt drives  Pt will need to negotiate steps to get to his office  Pt's impairments include reduced mobility, reduced ROM on L LE, hx of injury on L LE, reduced activity tolerance, gait abnormalities, risk of falling  These impairments limit the ability of the patient to perform mobility without increased assistance, return to PLOF and participate in everyday life activities  Pt would benefit from continued skilled therapy while in the hospital to improve overall mobility and work towards a safe d/c  Recommend discharge to rehab  At the end of the session the patient was left in seated position with call bell and phone within reach  PT encouraged to ambulate multiple times a day with assistance   Pt also educated about ambulating multiple times a day when working at home to encourage mobility  Barriers to Discharge Inaccessible home environment   Goals   STG Expiration Date 05/12/21   Short Term Goal #1 STG 1: Pt will perform transfers at a MI/I level to return to baseline of function  STG 2: Pt will ambulate 300ft with SPC/no AD at a MI/I level to reduce the level of assistance needed upon d/c home  STG 3: Pt will negotiate 12 steps with HR at a MI/I level to ensure safety with activity  STG 4: Pt will perform bed mobility at a I to safety return to PLOF  PT Treatment Day 0   Plan   Treatment/Interventions Functional transfer training;LE strengthening/ROM; Therapeutic exercise; Endurance training;Elevations; Bed mobility;Gait training   PT Frequency Other (Comment)  (3-5xwk)   Recommendation   PT Discharge Recommendation Home with outpatient rehabilitation   Equipment Recommended   (TBD)   PT - OK to Discharge No   Additional Comments need to try steps and ambulate further    AM-PAC Basic Mobility Inpatient   Turning in Bed Without Bedrails 4   Lying on Back to Sitting on Edge of Flat Bed 4   Moving Bed to Chair 3   Standing Up From Chair 4   Walk in Room 3   Climb 3-5 Stairs 3   Basic Mobility Inpatient Raw Score 21   Basic Mobility Standardized Score 45 55   Chanelle Riser, Pt, DPT
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
## 2021-04-28 NOTE — PROCEDURES
[address] Cardiac US    Date/Time: 4/28/2021 9:50 AM  Performed by: Venita Easton DO  Authorized by: Venita Easton DO     Patient location:  ICU  Other Assisting Provider: Yes (comment) (Dr Kelley Michaels)    Procedure details:     Exam Type:  Diagnostic and educational    Indications: chest pain      Assessment / Evaluation for: cardiac function and right heart strain (suspected pulmonary embolism)      Exam Type: initial exam      Image quality: limited diagnostic      Image availability:  Images available in PACS, video obtained and still images obtained  Patient Details:     Cardiac Rhythm:  Regular    Mechanical ventilation: No    Cardiac findings:     Echo technique: limited 2D      Views obtained: parasternal long axis, parasternal short axis and apical      Pericardial effusion: absent      Tamponade physiology: absent      Wall motion: normal      LV systolic function: normal      RV dilation: none    Cardiac Calculations:     TAPSE (RV function):  3  Interpretation:      No sign of right heart strain  Generally normal cardiac function
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
## 2021-04-28 NOTE — ASSESSMENT & PLAN NOTE
- CT CAP on presentation shows bladder wall thickening   Pt reports slight burning with urination, cloudy urine, with both urgency and frequency for approximately 2 weeks  - patient given 3 days of IV ceftriaxone  - urine cx negative, abx stopped  - no acute issues at this time
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
## 2021-04-29 ENCOUNTER — TELEPHONE (OUTPATIENT)
Dept: INTERVENTIONAL RADIOLOGY/VASCULAR | Facility: CLINIC | Age: 36
End: 2021-04-29

## 2021-04-29 PROBLEM — I82.90 VENOUS THROMBOSIS: Status: RESOLVED | Noted: 2021-04-24 | Resolved: 2021-04-29

## 2021-04-29 LAB
ABO GROUP BLD BPU: NORMAL
ANION GAP SERPL CALCULATED.3IONS-SCNC: 2 MMOL/L (ref 4–13)
BPU ID: NORMAL
BUN SERPL-MCNC: 12 MG/DL (ref 5–25)
CALCIUM SERPL-MCNC: 8.9 MG/DL (ref 8.3–10.1)
CHLORIDE SERPL-SCNC: 109 MMOL/L (ref 100–108)
CO2 SERPL-SCNC: 28 MMOL/L (ref 21–32)
CREAT SERPL-MCNC: 0.89 MG/DL (ref 0.6–1.3)
ERYTHROCYTE [DISTWIDTH] IN BLOOD BY AUTOMATED COUNT: 13.7 % (ref 11.6–15.1)
GFR SERPL CREATININE-BSD FRML MDRD: 127 ML/MIN/1.73SQ M
GLUCOSE SERPL-MCNC: 90 MG/DL (ref 65–140)
HCT VFR BLD AUTO: 27.9 % (ref 36.5–49.3)
HGB BLD-MCNC: 8.3 G/DL (ref 12–17)
MCH RBC QN AUTO: 25.1 PG (ref 26.8–34.3)
MCHC RBC AUTO-ENTMCNC: 29.7 G/DL (ref 31.4–37.4)
MCV RBC AUTO: 84 FL (ref 82–98)
PLATELET # BLD AUTO: 212 THOUSANDS/UL (ref 149–390)
PMV BLD AUTO: 10.9 FL (ref 8.9–12.7)
POTASSIUM SERPL-SCNC: 3.9 MMOL/L (ref 3.5–5.3)
RBC # BLD AUTO: 3.31 MILLION/UL (ref 3.88–5.62)
SODIUM SERPL-SCNC: 139 MMOL/L (ref 136–145)
UNIT DISPENSE STATUS: NORMAL
UNIT PRODUCT CODE: NORMAL
UNIT RH: NORMAL
WBC # BLD AUTO: 9.11 THOUSAND/UL (ref 4.31–10.16)

## 2021-04-29 PROCEDURE — 80048 BASIC METABOLIC PNL TOTAL CA: CPT | Performed by: HOSPITALIST

## 2021-04-29 PROCEDURE — 85027 COMPLETE CBC AUTOMATED: CPT | Performed by: HOSPITALIST

## 2021-04-29 PROCEDURE — 99232 SBSQ HOSP IP/OBS MODERATE 35: CPT | Performed by: HOSPITALIST

## 2021-04-29 RX ADMIN — OXYCODONE HYDROCHLORIDE 5 MG: 5 TABLET ORAL at 17:09

## 2021-04-29 RX ADMIN — RIVAROXABAN 15 MG: 15 TABLET, FILM COATED ORAL at 10:19

## 2021-04-29 RX ADMIN — DOCUSATE SODIUM 100 MG: 100 CAPSULE, LIQUID FILLED ORAL at 14:59

## 2021-04-29 RX ADMIN — RIVAROXABAN 15 MG: 15 TABLET, FILM COATED ORAL at 17:08

## 2021-04-29 RX ADMIN — IRON SUCROSE 300 MG: 20 INJECTION, SOLUTION INTRAVENOUS at 10:20

## 2021-04-29 NOTE — PLAN OF CARE
Problem: PAIN - ADULT  Goal: Verbalizes/displays adequate comfort level or baseline comfort level  Description: Interventions:  - Encourage patient to monitor pain and request assistance  - Assess pain using appropriate pain scale  - Administer analgesics based on type and severity of pain and evaluate response  - Implement non-pharmacological measures as appropriate and evaluate response  - Consider cultural and social influences on pain and pain management  - Notify physician/advanced practitioner if interventions unsuccessful or patient reports new pain  Outcome: Progressing     Problem: INFECTION - ADULT  Goal: Absence or prevention of progression during hospitalization  Description: INTERVENTIONS:  - Assess and monitor for signs and symptoms of infection  - Monitor lab/diagnostic results  - Monitor all insertion sites, i e  indwelling lines, tubes, and drains  - Monitor endotracheal if appropriate and nasal secretions for changes in amount and color  - [place] appropriate cooling/warming therapies per order  - Administer medications as ordered  - Instruct and encourage patient and family to use good hand hygiene technique  - Identify and instruct in appropriate isolation precautions for identified infection/condition  Outcome: Progressing  Goal: Absence of fever/infection during neutropenic period  Description: INTERVENTIONS:  - Monitor WBC    Outcome: Progressing     Problem: SAFETY ADULT  Goal: Patient will remain free of falls  Description: INTERVENTIONS:  - Assess patient frequently for physical needs  -  Identify cognitive and physical deficits and behaviors that affect risk of falls    -  [place] fall precautions as indicated by assessment   - Educate patient/family on patient safety including physical limitations  - Instruct patient to call for assistance with activity based on assessment  - Modify environment to reduce risk of injury  - Consider OT/PT consult to assist with strengthening/mobility  Outcome: Progressing  Goal: Maintain or return to baseline ADL function  Description: INTERVENTIONS:  -  Assess patient's ability to carry out ADLs; assess patient's baseline for ADL function and identify physical deficits which impact ability to perform ADLs (bathing, care of mouth/teeth, toileting, grooming, dressing, etc )  - Assess/evaluate cause of self-care deficits   - Assess range of motion  - Assess patient's mobility; develop plan if impaired  - Assess patient's need for assistive devices and provide as appropriate  - Encourage maximum independence but intervene and supervise when necessary  - Involve family in performance of ADLs  - Assess for home care needs following discharge   - Consider OT consult to assist with ADL evaluation and planning for discharge  - Provide patient education as appropriate  Outcome: Progressing  Goal: Maintain or return mobility status to optimal level  Description: INTERVENTIONS:  - Assess patient's baseline mobility status (ambulation, transfers, stairs, etc )    - Identify cognitive and physical deficits and behaviors that affect mobility  - Identify mobility aids required to assist with transfers and/or ambulation (gait belt, sit-to-stand, lift, walker, cane, etc )  - [place] fall precautions as indicated by assessment  - Record patient progress and toleration of activity level on Mobility SBAR; progress patient to next Phase/Stage  - Instruct patient to call for assistance with activity based on assessment  - Consider rehabilitation consult to assist with strengthening/weightbearing, etc   Outcome: Progressing     Problem: DISCHARGE PLANNING  Goal: Discharge to home or other facility with appropriate resources  Description: INTERVENTIONS:  - Identify barriers to discharge w/patient and caregiver  - Arrange for needed discharge resources and transportation as appropriate  - Identify discharge learning needs (meds, wound care, etc )  - Arrange for interpretive services to assist at discharge as needed  - Refer to Case Management Department for coordinating discharge planning if the patient needs post-hospital services based on physician/advanced practitioner order or complex needs related to functional status, cognitive ability, or social support system  Outcome: Progressing     Problem: Knowledge Deficit  Goal: Patient/family/caregiver demonstrates understanding of disease process, treatment plan, medications, and discharge instructions  Description: Complete learning assessment and assess knowledge base  Interventions:  - Provide teaching at level of understanding  - Provide teaching via preferred learning methods  Outcome: Progressing     Problem: Potential for Falls  Goal: Patient will remain free of falls  Description: INTERVENTIONS:  - Assess patient frequently for physical needs  -  Identify cognitive and physical deficits and behaviors that affect risk of falls    -  [place] fall precautions as indicated by assessment   - Educate patient/family on patient safety including physical limitations  - Instruct patient to call for assistance with activity based on assessment  - Modify environment to reduce risk of injury  - Consider OT/PT consult to assist with strengthening/mobility  Outcome: Progressing     Problem: Prexisting or High Potential for Compromised Skin Integrity  Goal: Skin integrity is maintained or improved  Description: INTERVENTIONS:  - Identify patients at risk for skin breakdown  - Assess and monitor skin integrity  - Assess and monitor nutrition and hydration status  - Monitor labs   - Assess for incontinence   - Turn and reposition patient  - Assist with mobility/ambulation  - Relieve pressure over bony prominences  - Avoid friction and shearing  - Provide appropriate hygiene as needed including keeping skin clean and dry  - Evaluate need for skin moisturizer/barrier cream  - Collaborate with interdisciplinary team   - Patient/family teaching  - Consider wound care consult   Outcome: Progressing

## 2021-04-29 NOTE — TELEPHONE ENCOUNTER
Rec'd in-basket message from Dr Audrey Pearson asking that pt be scheduled for an in-person visit with Dr Stuart Sandifer  Called pt to scheduled for 5/17 in [facility] and had to leave a message asking the pt to return my call
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
## 2021-04-30 ENCOUNTER — TELEPHONE (OUTPATIENT)
Dept: INTERNAL MEDICINE CLINIC | Facility: CLINIC | Age: 36
End: 2021-04-30

## 2021-04-30 LAB
ERYTHROCYTE [DISTWIDTH] IN BLOOD BY AUTOMATED COUNT: 13.7 % (ref 11.6–15.1)
HCT VFR BLD AUTO: 27.7 % (ref 36.5–49.3)
HGB BLD-MCNC: 8.3 G/DL (ref 12–17)
MCH RBC QN AUTO: 25.6 PG (ref 26.8–34.3)
MCHC RBC AUTO-ENTMCNC: 30 G/DL (ref 31.4–37.4)
MCV RBC AUTO: 86 FL (ref 82–98)
PLATELET # BLD AUTO: 236 THOUSANDS/UL (ref 149–390)
PMV BLD AUTO: 11.2 FL (ref 8.9–12.7)
RBC # BLD AUTO: 3.24 MILLION/UL (ref 3.88–5.62)
WBC # BLD AUTO: 8.72 THOUSAND/UL (ref 4.31–10.16)

## 2021-04-30 PROCEDURE — 99239 HOSP IP/OBS DSCHRG MGMT >30: CPT | Performed by: HOSPITALIST

## 2021-04-30 PROCEDURE — 85027 COMPLETE CBC AUTOMATED: CPT | Performed by: STUDENT IN AN ORGANIZED HEALTH CARE EDUCATION/TRAINING PROGRAM

## 2021-04-30 RX ORDER — FERROUS SULFATE TAB EC 324 MG (65 MG FE EQUIVALENT) 324 (65 FE) MG
324 TABLET DELAYED RESPONSE ORAL EVERY OTHER DAY
Qty: 60 TABLET | Refills: 0 | Status: SHIPPED | OUTPATIENT
Start: 2021-04-30

## 2021-04-30 RX ORDER — FERROUS SULFATE TAB EC 324 MG (65 MG FE EQUIVALENT) 324 (65 FE) MG
324 TABLET DELAYED RESPONSE ORAL
Qty: 60 TABLET | Refills: 1 | Status: SHIPPED | OUTPATIENT
Start: 2021-04-30 | End: 2021-04-30 | Stop reason: SDUPTHER

## 2021-04-30 RX ORDER — DOCUSATE SODIUM 100 MG/1
100 CAPSULE, LIQUID FILLED ORAL 2 TIMES DAILY
Qty: 10 CAPSULE | Refills: 0 | Status: SHIPPED | OUTPATIENT
Start: 2021-04-30

## 2021-04-30 RX ADMIN — RIVAROXABAN 15 MG: 15 TABLET, FILM COATED ORAL at 08:57

## 2021-04-30 RX ADMIN — OXYCODONE HYDROCHLORIDE 5 MG: 5 TABLET ORAL at 00:13

## 2021-04-30 NOTE — TELEPHONE ENCOUNTER
I received a message from Dr Alvis Closs to have me reach out to patient to set up a TCM/establish care   I called and spoke to patient and he stated he already has a PCP, Dr Deidra Batista in [place]

## 2021-04-30 NOTE — UTILIZATION REVIEW
Continued Stay Review    Date: 4/29 & 4/30/2021                       Current Patient Class: INPT Current Level of Care: MED-SURG    HPI:35 y o  male initially admitted INPT on 4/24 D/T  ILIOCAVAL VENOUS THROMBOSIS  Assessment/Plan: Doing well  afebrile, leukocytosis improved  Ambulating on left leg still c/o mild tightness in L groin and b/l legs but improving and ambulating around room by self  chest tightness w/ deep inspiration is improved  H/H stable , significant drop in H/H from >11 to low 8 3 today  Iron deficiency on iron panel  concern for GI blood loss  conservative approach  IV iron X1 TODAY  [place]  D/C ANTIBIOTIC  IVCF to likely be removed at later date, IR f/u in 3-4 weeks post discharge to discuss +/- additional venous intervention  PT/OT recommending home w/ PT      Vital Signs:   04/30/21 07:31:38  98 °F (36 7 °C)  --  20  124/74  91  96 %  None (Room air)  Sitting   04/29/21 23:20:43  98 2 °F (36 8 °C)  69  18  124/61  82  97 %  --  --   04/29/21 15:25:39  99 5 °F (37 5 °C)  70  --  125/61  82  98 %  --  Sitting   04/29/21 0819  --  --  --  --  --  --  None (Room air)  --   04/29/21 07:30:06  98 3 °F (36 8 °C)  67  18  107/62  77  100 %  --  --   04/28/21 23:46:25  98 2 °F (36 8 °C)  78  17  105/59  74  97 %  --  --         Pertinent Labs/Diagnostic Results:   Results from last 7 days   Lab Units 04/23/21 2015   SARS-COV-2  Negative     Results from last 7 days   Lab Units 04/30/21  0512 04/29/21  0237 04/28/21  0945 04/27/21  2330 04/27/21  1811   WBC Thousand/uL 8 72 9 11 10 48* 11 33* 11 48*   HEMOGLOBIN g/dL 8 3* 8 3* 9 4* 9 3* 9 8*   HEMATOCRIT % 27 7* 27 9* 30 8* 30 1* 31 8*   PLATELETS Thousands/uL 236 212 245 220 239   NEUTROS ABS Thousands/µL  --   --  7 72* 8 30* 7 97*     Results from last 7 days   Lab Units 04/26/21  0447   RETIC CT ABS  37,800   RETIC CT PCT % 1 00     Results from last 7 days   Lab Units 04/29/21  0237 04/28/21  0945 04/27/21  0459 04/25/21  0446 04/24/21  6468 SODIUM mmol/L 139 139 138 138 140   POTASSIUM mmol/L 3 9 4 1 4 2 3 8 3 7   CHLORIDE mmol/L 109* 109* 106 107 109*   CO2 mmol/L 28 29 24 26 26   ANION GAP mmol/L 2* 1* 8 5 5   BUN mg/dL 12 14 10 10 9   CREATININE mg/dL 0 89 1 19 1 02 0 97 1 08   EGFR ml/min/1 73sq m 127 90 109 116 102   CALCIUM mg/dL 8 9 8 9 9 0 9 2 9 3   MAGNESIUM mg/dL  --  2 5  --   --   --    PHOSPHORUS mg/dL  --  2 3*  --   --   --      Results from last 7 days   Lab Units 04/23/21 2001   AST U/L 17   ALT U/L 44   ALK PHOS U/L 89   TOTAL PROTEIN g/dL 8 8*   ALBUMIN g/dL 3 3*   TOTAL BILIRUBIN mg/dL 0 40         Results from last 7 days   Lab Units 04/29/21  0237 04/28/21  0945 04/27/21  0459 04/25/21  0446 04/24/21  0634 04/23/21 2001   GLUCOSE RANDOM mg/dL 90 141* 105 95 88 95       Results from last 7 days   Lab Units 04/28/21  1419 04/28/21  1229 04/28/21  0945 04/23/21 2001   TROPONIN I ng/mL <0 02 <0 02 <0 02 <0 02         Results from last 7 days   Lab Units 04/28/21  1419 04/28/21  0746 04/27/21  2330  04/23/21 2001   PROTIME seconds  --   --   --   --  15 3*   INR   --   --   --   --  1 23*   PTT seconds 83* 86* 125*   < > 28    < > = values in this interval not displayed         Results from last 7 days   Lab Units 04/24/21  0634   FERRITIN ng/mL 239       Results from last 7 days   Lab Units 04/23/21  2308   CLARITY UA  Clear   COLOR UA  [place]   SPEC GRAV UA  <=1 005   PH UA  5 0   GLUCOSE UA mg/dl Negative   KETONES UA mg/dl Trace*   BLOOD UA  Negative   PROTEIN UA mg/dl Trace*   NITRITE UA  Negative   BILIRUBIN UA  Interference- unable to analyze*   UROBILINOGEN UA E U /dl 1 0   LEUKOCYTES UA  Negative   WBC UA /hpf 10-20*   RBC UA /hpf None Seen   BACTERIA UA /hpf Occasional   EPITHELIAL CELLS WET PREP /hpf Moderate*   MUCUS THREADS  Occasional*       Results from last 7 days   Lab Units 04/23/21  6621   URINE CULTURE  No Growth <1000 cfu/mL         Medications:   Scheduled Medications:  docusate sodium, 100 mg, Oral, BID  rivaroxaban, 15 mg, Oral, BID With Meals  [START ON 5/20/2021] rivaroxaban, 20 mg, Oral, Daily With Breakfast  senna, 1 tablet, Oral, HS      PRN Meds:  acetaminophen, 650 mg, Oral, Q6H PRN  oxyCODONE, 2 5 mg, Oral, Q6H PRN    Or  oxyCODONE, 5 mg, Oral, Q6H PRN 4/29 X 1, 4/30 X 1  polyethylene glycol, 17 g, Oral, Daily PRN        Discharge Plan: D    Network Utilization Review Department  ATTENTION: Please call with any questions or concerns to [phone] and carefully listen to the prompts so that you are directed to the right person  All voicemails are confidential   Shivani Gorman all requests for admission clinical reviews, approved or denied determinations and any other requests to dedicated fax number below belonging to the campus where the patient is receiving treatment   List of dedicated fax numbers for the Facilities:  [address] DENIALS (Administrative/Medical Necessity) [phone]   [address] (Maternity/NICU/Pediatrics) [phone]   [address] [place] [address] [address] [place] [phone] [place] [address] [address] [facility] [place] [address] [phone]

## 2021-04-30 NOTE — PLAN OF CARE
Problem: PAIN - ADULT  Goal: Verbalizes/displays adequate comfort level or baseline comfort level  Description: Interventions:  - Encourage patient to monitor pain and request assistance  - Assess pain using appropriate pain scale  - Administer analgesics based on type and severity of pain and evaluate response  - Implement non-pharmacological measures as appropriate and evaluate response  - Consider cultural and social influences on pain and pain management  - Notify physician/advanced practitioner if interventions unsuccessful or patient reports new pain  Outcome: Progressing     Problem: INFECTION - ADULT  Goal: Absence or prevention of progression during hospitalization  Description: INTERVENTIONS:  - Assess and monitor for signs and symptoms of infection  - Monitor lab/diagnostic results  - Monitor all insertion sites, i e  indwelling lines, tubes, and drains  - Monitor endotracheal if appropriate and nasal secretions for changes in amount and color  - [place] appropriate cooling/warming therapies per order  - Administer medications as ordered  - Instruct and encourage patient and family to use good hand hygiene technique  - Identify and instruct in appropriate isolation precautions for identified infection/condition  Outcome: Progressing  Goal: Absence of fever/infection during neutropenic period  Description: INTERVENTIONS:  - Monitor WBC    Outcome: Progressing     Problem: SAFETY ADULT  Goal: Patient will remain free of falls  Description: INTERVENTIONS:  - Assess patient frequently for physical needs  -  Identify cognitive and physical deficits and behaviors that affect risk of falls    -  [place] fall precautions as indicated by assessment   - Educate patient/family on patient safety including physical limitations  - Instruct patient to call for assistance with activity based on assessment  - Modify environment to reduce risk of injury  - Consider OT/PT consult to assist with strengthening/mobility  Outcome: Progressing  Goal: Maintain or return to baseline ADL function  Description: INTERVENTIONS:  -  Assess patient's ability to carry out ADLs; assess patient's baseline for ADL function and identify physical deficits which impact ability to perform ADLs (bathing, care of mouth/teeth, toileting, grooming, dressing, etc )  - Assess/evaluate cause of self-care deficits   - Assess range of motion  - Assess patient's mobility; develop plan if impaired  - Assess patient's need for assistive devices and provide as appropriate  - Encourage maximum independence but intervene and supervise when necessary  - Involve family in performance of ADLs  - Assess for home care needs following discharge   - Consider OT consult to assist with ADL evaluation and planning for discharge  - Provide patient education as appropriate  Outcome: Progressing  Goal: Maintain or return mobility status to optimal level  Description: INTERVENTIONS:  - Assess patient's baseline mobility status (ambulation, transfers, stairs, etc )    - Identify cognitive and physical deficits and behaviors that affect mobility  - Identify mobility aids required to assist with transfers and/or ambulation (gait belt, sit-to-stand, lift, walker, cane, etc )  - [place] fall precautions as indicated by assessment  - Record patient progress and toleration of activity level on Mobility SBAR; progress patient to next Phase/Stage  - Instruct patient to call for assistance with activity based on assessment  - Consider rehabilitation consult to assist with strengthening/weightbearing, etc   Outcome: Progressing     Problem: DISCHARGE PLANNING  Goal: Discharge to home or other facility with appropriate resources  Description: INTERVENTIONS:  - Identify barriers to discharge w/patient and caregiver  - Arrange for needed discharge resources and transportation as appropriate  - Identify discharge learning needs (meds, wound care, etc )  - Arrange for interpretive services to assist at discharge as needed  - Refer to Case Management Department for coordinating discharge planning if the patient needs post-hospital services based on physician/advanced practitioner order or complex needs related to functional status, cognitive ability, or social support system  Outcome: Progressing     Problem: Knowledge Deficit  Goal: Patient/family/caregiver demonstrates understanding of disease process, treatment plan, medications, and discharge instructions  Description: Complete learning assessment and assess knowledge base  Interventions:  - Provide teaching at level of understanding  - Provide teaching via preferred learning methods  Outcome: Progressing     Problem: Potential for Falls  Goal: Patient will remain free of falls  Description: INTERVENTIONS:  - Assess patient frequently for physical needs  -  Identify cognitive and physical deficits and behaviors that affect risk of falls    -  [place] fall precautions as indicated by assessment   - Educate patient/family on patient safety including physical limitations  - Instruct patient to call for assistance with activity based on assessment  - Modify environment to reduce risk of injury  - Consider OT/PT consult to assist with strengthening/mobility  Outcome: Progressing     Problem: Prexisting or High Potential for Compromised Skin Integrity  Goal: Skin integrity is maintained or improved  Description: INTERVENTIONS:  - Identify patients at risk for skin breakdown  - Assess and monitor skin integrity  - Assess and monitor nutrition and hydration status  - Monitor labs   - Assess for incontinence   - Turn and reposition patient  - Assist with mobility/ambulation  - Relieve pressure over bony prominences  - Avoid friction and shearing  - Provide appropriate hygiene as needed including keeping skin clean and dry  - Evaluate need for skin moisturizer/barrier cream  - Collaborate with interdisciplinary team   - Patient/family teaching  - Consider wound care consult   Outcome: Progressing

## 2021-05-03 ENCOUNTER — TELEPHONE (OUTPATIENT)
Dept: SURGICAL ONCOLOGY | Facility: CLINIC | Age: 36
End: 2021-05-03

## 2021-05-03 NOTE — TELEPHONE ENCOUNTER
Spoke to pt in regards to scheduling with hematology  Pt is not interested in scheduling with us and he would like to speak and is going to speak to his pcp about this

## 2021-05-04 VITALS
TEMPERATURE: 98.2 F | RESPIRATION RATE: 20 BRPM | HEIGHT: 72 IN | OXYGEN SATURATION: 93 % | SYSTOLIC BLOOD PRESSURE: 95 MMHG | WEIGHT: 264 LBS | DIASTOLIC BLOOD PRESSURE: 62 MMHG | BODY MASS INDEX: 35.76 KG/M2 | HEART RATE: 69 BPM

## 2021-05-04 NOTE — UTILIZATION REVIEW
Notification of Discharge   This is a Notification of Discharge from our facility [address]  Please be advised that this patient has been discharge from our facility  Below you will find the admission and discharge date and time including the patients disposition  UTILIZATION REVIEW CONTACT:  Benjamín Keating RN  Utilization   Network Utilization Review Department  Phone: [phone] x carefully listen to the prompts  All voicemails are confidential   Email: [email or web address]     PHYSICIAN ADVISORY SERVICES:  FOR ZMPK-DI-DOJN REVIEW - MEDICAL NECESSITY DENIAL  Phone: [phone]  Fax: [phone]  Email: [email or web address]     PRESENTATION DATE: 4/24/2021  4:18 AM  INPATIENT ADMISSION DATE: 4/24/21 0418   DISCHARGE DATE: 4/30/2021  2:04 PM  DISPOSITION: Home/Self Care Home/Self Care      IMPORTANT INFORMATION:  Send all requests for admission clinical reviews, approved or denied determinations and any other requests to dedicated fax number below belonging to the campus where the patient is receiving treatment   List of dedicated fax numbers:  [address] DENIALS (Administrative/Medical Necessity) [phone]   [address] (Maternity/NICU/Pediatrics) [phone]   Dyllan Rees [phone]   Oswald Ceja [phone]   [facility] [phone]   [facility] [address] [place] [address] [phone]   [facility]  [phone]   [address] [facility]  [address] [facility] 1000 [facility] [phone]

## 2021-05-17 ENCOUNTER — CONSULT (OUTPATIENT)
Dept: INTERVENTIONAL RADIOLOGY/VASCULAR | Facility: CLINIC | Age: 36
End: 2021-05-17
Payer: COMMERCIAL

## 2021-05-17 ENCOUNTER — PREP FOR PROCEDURE (OUTPATIENT)
Dept: INTERVENTIONAL RADIOLOGY/VASCULAR | Facility: CLINIC | Age: 36
End: 2021-05-17

## 2021-05-17 VITALS
HEART RATE: 79 BPM | DIASTOLIC BLOOD PRESSURE: 64 MMHG | SYSTOLIC BLOOD PRESSURE: 128 MMHG | BODY MASS INDEX: 35.35 KG/M2 | HEIGHT: 72 IN | WEIGHT: 261 LBS

## 2021-05-17 DIAGNOSIS — I82.90 VENOUS THROMBOSIS: ICD-10-CM

## 2021-05-17 DIAGNOSIS — I82.90 VENOUS THROMBOSIS: Primary | ICD-10-CM

## 2021-05-17 PROCEDURE — 99215 OFFICE O/P EST HI 40 MIN: CPT | Performed by: RADIOLOGY

## 2021-05-17 NOTE — PROGRESS NOTES
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  The filter was placed for trauma 4 years ago and is no longer required as he will now remain on anticoagulation for the rest of his life  There are 2 main issues to address: The IVC filter: We discussed the natural history of inferior vena cava filters and how currently his filter raises his risk of thrombotic events without mortality benefit  As he will continue on anticoagulation and is long required and I recommend removal   Given 12 time we will schedule this with the anesthesia service  We discussed the risks of removal including access site complications, cava injury, fragment embolization, or severe bleeding  Based on symptoms he probably did have some degree of pulmonary emboli shedding from the recent large clot  Resulting in dyspnea  I would prefer he stay on anticoagulation for several more months before we attempt retrieval     Lower extremity Edema:    I discussed that this will be a lifelong issue related to his compromised veins  His venous outflow, particularly in the iliac system is scarred and abnormal as well as the femoral segment  Unfortunately do not have replacement valves or a good method to maintain the patency of these veins  Several of the venous stents  Recently introduced are now on recall and given his young age and long expected life span you would referable to avoid stent placement if possible  Currently I would stage his left lower extremity as CEAP C4 disease and right as C3 disease  These will require continued follow-up and management, possibly with my vascular surgery colleagues given the varicosities    We will continue to monitor after filter retrieval     Diagnoses and all orders for this visit:    Iliocaval venous thrombosis  -     Ambulatory referral to Interventional Radiology         Problem List Items Addressed This Visit        Cardiovascular and Mediastinum    Iliocaval venous thrombosis           Subjective:     HPI: Coral Oropeza is a 38 y o  male  With leg swelling and inferior vena cava thrombosis  Briefly in the setting of lower extremity trauma he had a [place]  Inferior vena cava filter placed October 2017  He was maintained on anticoagulation for some period of time but then stopped  he presented to the emergency room in April for fever associated with the COVID vaccine  Several days later he presented with left upper leg swelling and was found to have thrombosis of his iliac veins up to the filter  I personally reviewed his prior imaging  Extensive caval thrombosis with successful thrombolysis and recanalization  Intravascular ultrasound was performed at the time of the lysis recheck, during which I was actually consulted for intraoperative guidance and interpretation with Dr Jamari De Oliveira  Given his chronic leg patella changes at that time angioplasty was performed to allow for a flow channel  Additional medical history includes diverticulitis for which he had a colon resection  he also has lower extremity edema and varicose veins  He takes medical marijuana for chronic leg pain     currently he is able to walk up to a mi without shortness of breath  He is able to perform all of his normal activities of daily living  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  Review of Systems   Constitutional: Negative  HENT: Negative  Eyes: Negative  Respiratory: Negative  Cardiovascular: Positive for leg swelling  Painful veins   Gastrointestinal: Negative  Endocrine: Negative  Genitourinary: Negative  Musculoskeletal: Negative  Skin: Negative  Allergic/Immunologic: Negative  Neurological: Negative  Hematological:        Clots easily   Psychiatric/Behavioral: Negative            Past Medical History:   Diagnosis Date    Deep vein thrombosis (DVT) (HCC)     Diverticulitis     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        Past Surgical History:   Procedure Laterality Date    COLON SURGERY      FEMUR SURGERY      IR DVT THROMBOLYSIS/THROMBECTOMY ILIAC/IVC WITH VENOGRAM  4/26/2021    IR TPA LYSIS CHECK  4/27/2021    KNEE SURGERY Bilateral     SHOULDER SURGERY Left         Social History     Tobacco Use   Smoking Status Former Smoker    Types: Cigarettes   Smokeless Tobacco Never Used        Reports a very strong family history of venous thromboembolism including lower extremity deep venous thrombosis in his father and additional events in grandparents    Social History     Substance and Sexual Activity   Alcohol Use Not Currently     Uses alcohol and marijuana    Social History     Substance and Sexual Activity   Drug Use Yes    Types: Marijuana    Comment: medical card        No Known Allergies      Current Outpatient Medications   Medication Sig Dispense Refill    ferrous sulfate 324 (65 Fe) mg Take 1 tablet (324 mg total) by mouth every other day 60 tablet 0    rivaroxaban (XARELTO) 15 mg tablet Take 1 tablet (15 mg total) by mouth 2 (two) times a day with meals for 19 days 38 tablet 0    docusate sodium (COLACE) 100 mg capsule Take 1 capsule (100 mg total) by mouth 2 (two) times a day (Patient not taking: Reported on 5/17/2021) 10 capsule 0    [START ON 5/20/2021] rivaroxaban (XARELTO) 20 mg tablet Take 1 tablet (20 mg total) by mouth daily with breakfast (Patient not taking: Reported on 5/17/2021) 30 tablet 1     No current facility-administered medications for this visit  Objective:    Vitals:    05/17/21 1036   BP: 128/64   BP Location: Right arm   Patient Position: Sitting   Cuff Size: Standard   Pulse: 79   Weight: 118 kg (261 lb)   Height: 6' (1 829 m)        Physical Exam  Constitutional:       Appearance: He is obese  HENT:      Head: Normocephalic        Right Ear: Tympanic membrane and ear canal normal       Nose: Nose normal       Mouth/Throat:      Mouth: Mucous membranes are moist       Pharynx: Oropharynx is clear  Eyes:      Pupils: Pupils are equal, round, and reactive to light  Neck:      Musculoskeletal: Normal range of motion  Cardiovascular:      Rate and Rhythm: Normal rate and regular rhythm  Pulses: Normal pulses  Pulmonary:      Effort: Pulmonary effort is normal       Breath sounds: Normal breath sounds  Abdominal:      General: Abdomen is flat  Bowel sounds are normal    Musculoskeletal: Normal range of motion  Comments: Scars on legs from surgeries   Skin:     General: Skin is warm  Capillary Refill: Capillary refill takes less than 2 seconds  Comments: LLE with nonpitting edema  Skin changes (dark excema) near medial left ankle  No palp varicosities    RLE with SSV and superficial varicosities  Mild nonpitting edema   Neurological:      General: No focal deficit present  Mental Status: He is alert  Mental status is at baseline  Psychiatric:         Mood and Affect: Mood normal            Lab Results   Component Value Date    K 3 9 04/29/2021     (H) 04/29/2021    CO2 28 04/29/2021    BUN 12 04/29/2021    CREATININE 0 89 04/29/2021    CALCIUM 8 9 04/29/2021    CORRECTEDCA 9 7 04/23/2021    AST 17 04/23/2021    ALT 44 04/23/2021    ALKPHOS 89 04/23/2021    EGFR 127 04/29/2021      Lab Results   Component Value Date    WBC 8 72 04/30/2021    HGB 8 3 (L) 04/30/2021    HCT 27 7 (L) 04/30/2021    MCV 86 04/30/2021     04/30/2021     Lab Results   Component Value Date    INR 1 23 (H) 04/23/2021    PROTIME 15 3 (H) 04/23/2021       I have personally reviewed pertinent imaging and laboratory results       Code Status: [unfilled]  Advance Directive and Living Will:      Power of :    POLST:        Thank you for allowing me to participate in the care of Bola Coley  Please don't hesitate to call, text, email, or TigerText with any questions

## 2021-05-17 NOTE — LETTER
May 17, 2021     Brandie Heaton MD  [address]  [address] [place] [address]    Patient: Lisa Kinsey   YOB: 1985   Date of Visit: 5/17/2021       Dear Dr Lillian Caldwell: Thank you for referring Lisa Kinsey to me for evaluation  Below are my notes for this consultation  If you have questions, please do not hesitate to call me  I look forward to following your patient along with you  Sincerely,        Tania Campuzano MD        CC: MD Natalia Rose MD Hollace Genin, MD  5/17/2021 11:55 PM  Sign when Signing Visit  Interventional Radiology Ambulatory Visit 5/17/2021    Lisa Kinsey   1985   [id number]      Assessment/Plan:     35M referred by Dr Ivone Roblero after thrombolysis for inferior vena cava thrombosis  The filter was placed for trauma 4 years ago and is no longer required as he will now remain on anticoagulation for the rest of his life  There are 2 main issues to address: The IVC filter: We discussed the natural history of inferior vena cava filters and how currently his filter raises his risk of thrombotic events without mortality benefit  As he will continue on anticoagulation and is long required and I recommend removal   Given 12 time we will schedule this with the anesthesia service  We discussed the risks of removal including access site complications, cava injury, fragment embolization, or severe bleeding  Based on symptoms he probably did have some degree of pulmonary emboli shedding from the recent large clot  Resulting in dyspnea  I would prefer he stay on anticoagulation for several more months before we attempt retrieval     Lower extremity Edema:    I discussed that this will be a lifelong issue related to his compromised veins  His venous outflow, particularly in the iliac system is scarred and abnormal as well as the femoral segment    Unfortunately do not have replacement valves or a good method to maintain the patency of these veins  Several of the venous stents  Recently introduced are now on recall and given his young age and long expected life span you would referable to avoid stent placement if possible  Currently I would stage his left lower extremity as CEAP C4 disease and right as C3 disease  These will require continued follow-up and management, possibly with my vascular surgery colleagues given the varicosities  We will continue to monitor after filter retrieval     Diagnoses and all orders for this visit:    Iliocaval venous thrombosis  -     Ambulatory referral to Interventional Radiology         Problem List Items Addressed This Visit        Cardiovascular and Mediastinum    Iliocaval venous thrombosis           Subjective:     HPI: Mercy Bautista is a 38 y o  male  With leg swelling and inferior vena cava thrombosis  Briefly in the setting of lower extremity trauma he had a [place]  Inferior vena cava filter placed October 2017  He was maintained on anticoagulation for some period of time but then stopped  he presented to the emergency room in April for fever associated with the COVID vaccine  Several days later he presented with left upper leg swelling and was found to have thrombosis of his iliac veins up to the filter  I personally reviewed his prior imaging  Extensive caval thrombosis with successful thrombolysis and recanalization  Intravascular ultrasound was performed at the time of the lysis recheck, during which I was actually consulted for intraoperative guidance and interpretation with Dr Molly Warren  Given his chronic leg patella changes at that time angioplasty was performed to allow for a flow channel  Additional medical history includes diverticulitis for which he had a colon resection  he also has lower extremity edema and varicose veins  He takes medical marijuana for chronic leg pain     currently he is able to walk up to a mi without shortness of breath    He is able to perform all of his normal activities of daily living  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  Review of Systems   Constitutional: Negative  HENT: Negative  Eyes: Negative  Respiratory: Negative  Cardiovascular: Positive for leg swelling  Painful veins   Gastrointestinal: Negative  Endocrine: Negative  Genitourinary: Negative  Musculoskeletal: Negative  Skin: Negative  Allergic/Immunologic: Negative  Neurological: Negative  Hematological:        Clots easily   Psychiatric/Behavioral: Negative            Past Medical History:   Diagnosis Date    Deep vein thrombosis (DVT) (HCC)     Diverticulitis     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        Past Surgical History:   Procedure Laterality Date    COLON SURGERY      FEMUR SURGERY      IR DVT THROMBOLYSIS/THROMBECTOMY ILIAC/IVC WITH VENOGRAM  4/26/2021    IR TPA LYSIS CHECK  4/27/2021    KNEE SURGERY Bilateral     SHOULDER SURGERY Left         Social History     Tobacco Use   Smoking Status Former Smoker    Types: Cigarettes   Smokeless Tobacco Never Used        Reports a very strong family history of venous thromboembolism including lower extremity deep venous thrombosis in his father and additional events in grandparents    Social History     Substance and Sexual Activity   Alcohol Use Not Currently     Uses alcohol and marijuana    Social History     Substance and Sexual Activity   Drug Use Yes    Types: Marijuana    Comment: medical card        No Known Allergies      Current Outpatient Medications   Medication Sig Dispense Refill    ferrous sulfate 324 (65 Fe) mg Take 1 tablet (324 mg total) by mouth every other day 60 tablet 0    rivaroxaban (XARELTO) 15 mg tablet Take 1 tablet (15 mg total) by mouth 2 (two) times a day with meals for 19 days 38 tablet 0    docusate sodium (COLACE) 100 mg capsule Take 1 capsule (100 mg total) by mouth 2 (two) times a day (Patient not taking: Reported on 5/17/2021) 10 capsule 0    [START ON 5/20/2021] rivaroxaban (XARELTO) 20 mg tablet Take 1 tablet (20 mg total) by mouth daily with breakfast (Patient not taking: Reported on 5/17/2021) 30 tablet 1     No current facility-administered medications for this visit  Objective:    Vitals:    05/17/21 1036   BP: 128/64   BP Location: Right arm   Patient Position: Sitting   Cuff Size: Standard   Pulse: 79   Weight: 118 kg (261 lb)   Height: 6' (1 829 m)        Physical Exam  Constitutional:       Appearance: He is obese  HENT:      Head: Normocephalic  Right Ear: Tympanic membrane and ear canal normal       Nose: Nose normal       Mouth/Throat:      Mouth: Mucous membranes are moist       Pharynx: Oropharynx is clear  Eyes:      Pupils: Pupils are equal, round, and reactive to light  Neck:      Musculoskeletal: Normal range of motion  Cardiovascular:      Rate and Rhythm: Normal rate and regular rhythm  Pulses: Normal pulses  Pulmonary:      Effort: Pulmonary effort is normal       Breath sounds: Normal breath sounds  Abdominal:      General: Abdomen is flat  Bowel sounds are normal    Musculoskeletal: Normal range of motion  Comments: Scars on legs from surgeries   Skin:     General: Skin is warm  Capillary Refill: Capillary refill takes less than 2 seconds  Comments: LLE with nonpitting edema  Skin changes (dark excema) near medial left ankle  No palp varicosities    RLE with SSV and superficial varicosities  Mild nonpitting edema   Neurological:      General: No focal deficit present  Mental Status: He is alert  Mental status is at baseline     Psychiatric:         Mood and Affect: Mood normal            Lab Results   Component Value Date    K 3 9 04/29/2021     (H) 04/29/2021    CO2 28 04/29/2021    BUN 12 04/29/2021    CREATININE 0 89 04/29/2021    CALCIUM 8 9 04/29/2021    CORRECTEDCA 9 7 04/23/2021    AST 17 04/23/2021    ALT 44 04/23/2021    ALKPHOS 89 04/23/2021 EGFR 127 04/29/2021      Lab Results   Component Value Date    WBC 8 72 04/30/2021    HGB 8 3 (L) 04/30/2021    HCT 27 7 (L) 04/30/2021    MCV 86 04/30/2021     04/30/2021     Lab Results   Component Value Date    INR 1 23 (H) 04/23/2021    PROTIME 15 3 (H) 04/23/2021       I have personally reviewed pertinent imaging and laboratory results  Code Status: [unfilled]  Advance Directive and Living Will:      Power of :    POLST:        Thank you for allowing me to participate in the care of Amber Seat  Please don't hesitate to call, text, email, or TigerText with any questions

## 2021-06-14 ENCOUNTER — TELEPHONE (OUTPATIENT)
Dept: RADIOLOGY | Facility: HOSPITAL | Age: 36
End: 2021-06-14

## 2021-06-14 RX ORDER — SODIUM CHLORIDE 9 MG/ML
75 INJECTION, SOLUTION INTRAVENOUS CONTINUOUS
Status: CANCELLED | OUTPATIENT
Start: 2021-06-14

## 2021-06-14 NOTE — PRE-PROCEDURE INSTRUCTIONS
Phone Consult completed:Pre procedure instructions for Filter removal reviewed with verbal understanding  Allergies,meds( holding Xarelto),NPO, and ride  Approximate arrival time given,SDS phone call evening before procedure  Covid vaccine completed May 2021

## 2021-06-21 ENCOUNTER — HOSPITAL ENCOUNTER (OUTPATIENT)
Dept: RADIOLOGY | Facility: HOSPITAL | Age: 36
Discharge: HOME | End: 2021-06-21
Attending: RADIOLOGY | Admitting: RADIOLOGY
Payer: COMMERCIAL

## 2021-06-21 ENCOUNTER — ANESTHESIA (OUTPATIENT)
Dept: RADIOLOGY | Facility: HOSPITAL | Age: 36
End: 2021-06-21
Payer: COMMERCIAL

## 2021-06-21 ENCOUNTER — ANESTHESIA EVENT (OUTPATIENT)
Dept: RADIOLOGY | Facility: HOSPITAL | Age: 36
End: 2021-06-21
Payer: COMMERCIAL

## 2021-06-21 VITALS
HEART RATE: 66 BPM | RESPIRATION RATE: 18 BRPM | TEMPERATURE: 98.6 F | SYSTOLIC BLOOD PRESSURE: 138 MMHG | OXYGEN SATURATION: 96 % | DIASTOLIC BLOOD PRESSURE: 85 MMHG

## 2021-06-21 DIAGNOSIS — I82.90 VENOUS THROMBOSIS: ICD-10-CM

## 2021-06-21 PROCEDURE — 37193 REM ENDOVAS VENA CAVA FILTER: CPT

## 2021-06-21 PROCEDURE — C1769 GUIDE WIRE: HCPCS

## 2021-06-21 PROCEDURE — 37193 REM ENDOVAS VENA CAVA FILTER: CPT | Performed by: RADIOLOGY

## 2021-06-21 PROCEDURE — 88300 SURGICAL PATH GROSS: CPT | Performed by: PATHOLOGY

## 2021-06-21 PROCEDURE — NC001 PR NO CHARGE: Performed by: RADIOLOGY

## 2021-06-21 PROCEDURE — C1773 RET DEV, INSERTABLE: HCPCS

## 2021-06-21 PROCEDURE — C1894 INTRO/SHEATH, NON-LASER: HCPCS

## 2021-06-21 RX ORDER — LIDOCAINE HYDROCHLORIDE 10 MG/ML
INJECTION, SOLUTION EPIDURAL; INFILTRATION; INTRACAUDAL; PERINEURAL AS NEEDED
Status: DISCONTINUED | OUTPATIENT
Start: 2021-06-21 | End: 2021-06-21

## 2021-06-21 RX ORDER — MIDAZOLAM HYDROCHLORIDE 2 MG/2ML
INJECTION, SOLUTION INTRAMUSCULAR; INTRAVENOUS AS NEEDED
Status: DISCONTINUED | OUTPATIENT
Start: 2021-06-21 | End: 2021-06-21

## 2021-06-21 RX ORDER — ONDANSETRON 2 MG/ML
INJECTION INTRAMUSCULAR; INTRAVENOUS AS NEEDED
Status: DISCONTINUED | OUTPATIENT
Start: 2021-06-21 | End: 2021-06-21

## 2021-06-21 RX ORDER — SODIUM CHLORIDE 9 MG/ML
75 INJECTION, SOLUTION INTRAVENOUS CONTINUOUS
Status: DISCONTINUED | OUTPATIENT
Start: 2021-06-21 | End: 2021-06-21 | Stop reason: HOSPADM

## 2021-06-21 RX ORDER — GLYCOPYRROLATE 0.2 MG/ML
INJECTION INTRAMUSCULAR; INTRAVENOUS AS NEEDED
Status: DISCONTINUED | OUTPATIENT
Start: 2021-06-21 | End: 2021-06-21

## 2021-06-21 RX ORDER — FENTANYL CITRATE 50 UG/ML
INJECTION, SOLUTION INTRAMUSCULAR; INTRAVENOUS AS NEEDED
Status: DISCONTINUED | OUTPATIENT
Start: 2021-06-21 | End: 2021-06-21

## 2021-06-21 RX ORDER — DEXMEDETOMIDINE HYDROCHLORIDE 100 UG/ML
INJECTION, SOLUTION INTRAVENOUS AS NEEDED
Status: DISCONTINUED | OUTPATIENT
Start: 2021-06-21 | End: 2021-06-21

## 2021-06-21 RX ORDER — DEXAMETHASONE SODIUM PHOSPHATE 10 MG/ML
INJECTION, SOLUTION INTRAMUSCULAR; INTRAVENOUS AS NEEDED
Status: DISCONTINUED | OUTPATIENT
Start: 2021-06-21 | End: 2021-06-21

## 2021-06-21 RX ORDER — PROPOFOL 10 MG/ML
INJECTION, EMULSION INTRAVENOUS CONTINUOUS PRN
Status: DISCONTINUED | OUTPATIENT
Start: 2021-06-21 | End: 2021-06-21

## 2021-06-21 RX ADMIN — DEXMEDETOMIDINE HCL 8 MCG: 100 INJECTION INTRAVENOUS at 11:16

## 2021-06-21 RX ADMIN — PROPOFOL 120 MCG/KG/MIN: 10 INJECTION, EMULSION INTRAVENOUS at 11:16

## 2021-06-21 RX ADMIN — DEXAMETHASONE SODIUM PHOSPHATE 5 MG: 10 INJECTION, SOLUTION INTRAMUSCULAR; INTRAVENOUS at 11:20

## 2021-06-21 RX ADMIN — ONDANSETRON 4 MG: 2 INJECTION INTRAMUSCULAR; INTRAVENOUS at 11:20

## 2021-06-21 RX ADMIN — GLYCOPYRROLATE 0.2 MG: 0.2 INJECTION, SOLUTION INTRAMUSCULAR; INTRAVENOUS at 11:16

## 2021-06-21 RX ADMIN — DEXMEDETOMIDINE HCL 8 MCG: 100 INJECTION INTRAVENOUS at 11:25

## 2021-06-21 RX ADMIN — FENTANYL CITRATE 25 MCG: 50 INJECTION INTRAMUSCULAR; INTRAVENOUS at 11:20

## 2021-06-21 RX ADMIN — DEXMEDETOMIDINE HCL 8 MCG: 100 INJECTION INTRAVENOUS at 11:02

## 2021-06-21 RX ADMIN — SODIUM CHLORIDE: 0.9 INJECTION, SOLUTION INTRAVENOUS at 11:09

## 2021-06-21 RX ADMIN — FENTANYL CITRATE 25 MCG: 50 INJECTION INTRAMUSCULAR; INTRAVENOUS at 11:16

## 2021-06-21 RX ADMIN — DEXMEDETOMIDINE HCL 8 MCG: 100 INJECTION INTRAVENOUS at 11:30

## 2021-06-21 RX ADMIN — DEXMEDETOMIDINE HCL 8 MCG: 100 INJECTION INTRAVENOUS at 11:20

## 2021-06-21 RX ADMIN — LIDOCAINE HYDROCHLORIDE 50 MG: 10 INJECTION, SOLUTION EPIDURAL; INFILTRATION; INTRACAUDAL; PERINEURAL at 11:16

## 2021-06-21 RX ADMIN — DEXMEDETOMIDINE HCL 8 MCG: 100 INJECTION INTRAVENOUS at 11:35

## 2021-06-21 RX ADMIN — IOHEXOL 60 ML: 350 INJECTION, SOLUTION INTRAVENOUS at 12:32

## 2021-06-21 RX ADMIN — MIDAZOLAM 2 MG: 1 INJECTION INTRAMUSCULAR; INTRAVENOUS at 11:10

## 2021-06-21 RX ADMIN — FENTANYL CITRATE 25 MCG: 50 INJECTION INTRAMUSCULAR; INTRAVENOUS at 11:51

## 2021-06-21 NOTE — ANESTHESIA PREPROCEDURE EVALUATION
Procedure:  IR IVC FILTER REMOVAL    Relevant Problems   CARDIO   (+) Iliocaval venous thrombosis      HEMATOLOGY   (+) Anemia      Other   (+) History of partial colectomy        Physical Exam    Airway    Mallampati score: II  TM Distance: >3 FB  Neck ROM: full     Dental       Cardiovascular  Rhythm: regular, Rate: normal,     Pulmonary  Breath sounds clear to auscultation,     Other Findings        Anesthesia Plan  ASA Score- 3     Anesthesia Type- IV sedation with anesthesia with ASA Monitors  Additional Monitors:   Airway Plan:           Plan Factors-    Chart reviewed  EKG reviewed  Existing labs reviewed  Patient summary reviewed  Induction- intravenous  Postoperative Plan-     Informed Consent- Anesthetic plan and risks discussed with patient  I personally reviewed this patient with the CRNA  Discussed and agreed on the Anesthesia Plan with the CRNA  Guilherme Metz

## 2021-06-21 NOTE — H&P
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    Patient previously seen in clinic, filter retrieval is indicated    He has post thrombotic changes and leg swelling but this should be dealt with separately    We discussed rationale for filter retrieval given his young age as well as the risks which include bleeding, damage to the inferior vena cava, distal embolization, fracture    He has held his blood thinners  If there is extensive thrombus within the filter we abort removal   Appreciate anesthesia support      Problem List Items Addressed This Visit        Cardiovascular and Mediastinum    Iliocaval venous thrombosis    Relevant Orders    IR IVC filter removal             Subjective:     Patient ID: Sheryl Riley is a 38 y o  male      History of Present Illness  As above    Prior imaging reviewed    Review of Systems      Past Medical History:   Diagnosis Date    Deep vein thrombosis (DVT) (HCC)     Diverticulitis     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        Past Surgical History:   Procedure Laterality Date    COLON SURGERY      FEMUR SURGERY      IR DVT THROMBOLYSIS/THROMBECTOMY ILIAC/IVC WITH VENOGRAM  4/26/2021    IR TPA LYSIS CHECK  4/27/2021    KNEE SURGERY Bilateral     SHOULDER SURGERY Left         Social History     Tobacco Use   Smoking Status Former Smoker    Types: Cigarettes   Smokeless Tobacco Never Used        Social History     Substance and Sexual Activity   Alcohol Use Not Currently        Social History     Substance and Sexual Activity   Drug Use Yes    Types: Marijuana    Comment: medical card        No Known Allergies    Current Facility-Administered Medications   Medication Dose Route Frequency Provider Last Rate Last Admin    sodium chloride 0 9 % infusion  75 mL/hr Intravenous Continuous Armand Martinez MD         Facility-Administered Medications Ordered in Other Encounters   Medication Dose Route Frequency Provider Last Rate Last Admin    dexmedetomidine (PRECEDEX) injection   Intravenous PRN Donneta Cannon Ball, CRNA   8 mcg at 06/21/21 1102    midazolam (VERSED) injection   Intravenous PRN Donneta Bernardo, CRNA   2 mg at 06/21/21 1110          Objective:    Vitals:    06/21/21 1041   BP: 140/80   Pulse: 64   Resp: 18   Temp: 98 6 °F (37 °C)   TempSrc: Temporal   SpO2: 97%        Physical Exam      No results found for: BNP   Lab Results   Component Value Date    WBC 8 72 04/30/2021    HGB 8 3 (L) 04/30/2021    HCT 27 7 (L) 04/30/2021    MCV 86 04/30/2021     04/30/2021     Lab Results   Component Value Date    INR 1 23 (H) 04/23/2021    PROTIME 15 3 (H) 04/23/2021     Lab Results   Component Value Date    PTT 83 (H) 04/28/2021         I have personally reviewed pertinent imaging and laboratory results  Code Status: Prior  Advance Directive and Living Will:      Power of :    POLST:      This text is generated with voice recognition software  There may be translation, syntax,  or grammatical errors  If you have any questions, please contact the dictating provider

## 2021-06-21 NOTE — SEDATION DOCUMENTATION
IVC filter removed under anesthesia care in IR by Dr Sheldon Etienne without complication  Bedrest start time 0354

## 2021-06-21 NOTE — DISCHARGE INSTRUCTIONS
Inferior Vena Cava Filter Removal     WHAT YOU NEED TO KNOW:   Inferior vena cava (IVC) filter removal is a procedure to remove your IVC filter  DISCHARGE INSTRUCTIONS:     Wound care: Keep your wound clean and dry  Bandaide may come off in 24 hours  Self-care:   · Limit activity for 24 hours  Slowly start to do more each day  Return to your daily activities as directed  · Resume your normal diet  Small sips of flat soda will help with nausea  Contact Interventional Radiology at [phone] Virgen PATIENTS: Contact Interventional Radiology at [phone]) Trisha Segura PATIENTS: Contact Interventional Radiology at [phone]) if:  · You have a fever  · Persistent nausea or vomiting  · You have chills, a cough, or feel weak and achy  · Your wound is red, swollen, or draining pus  · You have questions or concerns about your condition

## 2021-06-21 NOTE — BRIEF OP NOTE (RAD/CATH)
INTERVENTIONAL RADIOLOGY PROCEDURE NOTE    Date: 6/21/2021    Procedure: IR IVC FILTER REMOVAL    Preoperative diagnosis:   1  Iliocaval venous thrombosis         Postoperative diagnosis: Same  Surgeon: Bruno Magallon MD     Assistant: None  No qualified resident was available  Blood loss: 0    Specimens: Filter for pathology     Findings:     Patent inferior vena cava    KULDEEP filter removed in its entirety, extensive fibrin    Right IJ access    Complications: None immediate      Anesthesia: general

## 2021-06-21 NOTE — ANESTHESIA POSTPROCEDURE EVALUATION
Post-Op Assessment Note    CV Status:  Stable  Pain Score: 0    Pain management: adequate     Mental Status:  Alert and awake   Hydration Status:  Euvolemic   PONV Controlled:  Controlled   Airway Patency:  Patent and adequate      Post Op Vitals Reviewed: Yes      Staff: CRNA         No complications documented      /70   Temp   36 5   Pulse  70   Resp   20   SpO2   97% on RA

## 2022-02-08 ENCOUNTER — APPOINTMENT (EMERGENCY)
Dept: RADIOLOGY | Facility: HOSPITAL | Age: 37
End: 2022-02-08
Payer: COMMERCIAL

## 2022-02-08 ENCOUNTER — HOSPITAL ENCOUNTER (EMERGENCY)
Facility: HOSPITAL | Age: 37
Discharge: HOME | End: 2022-02-08
Attending: EMERGENCY MEDICINE
Payer: COMMERCIAL

## 2022-02-08 VITALS
SYSTOLIC BLOOD PRESSURE: 162 MMHG | HEART RATE: 74 BPM | RESPIRATION RATE: 18 BRPM | TEMPERATURE: 98.7 F | BODY MASS INDEX: 42.66 KG/M2 | DIASTOLIC BLOOD PRESSURE: 74 MMHG | HEIGHT: 72 IN | OXYGEN SATURATION: 98 % | WEIGHT: 315 LBS

## 2022-02-08 DIAGNOSIS — R07.9 CHEST PAIN: Primary | ICD-10-CM

## 2022-02-08 LAB
2HR DELTA HS TROPONIN: -1 NG/L
ALBUMIN SERPL BCP-MCNC: 3.6 G/DL (ref 3.5–5)
ALP SERPL-CCNC: 101 U/L (ref 46–116)
ALT SERPL W P-5'-P-CCNC: 41 U/L (ref 12–78)
ANION GAP SERPL CALCULATED.3IONS-SCNC: 6 MMOL/L (ref 4–13)
APTT PPP: 29 SECONDS (ref 23–37)
AST SERPL W P-5'-P-CCNC: 20 U/L (ref 5–45)
ATRIAL RATE: 74 BPM
BASOPHILS # BLD AUTO: 0.07 THOUSANDS/ΜL (ref 0–0.1)
BASOPHILS NFR BLD AUTO: 1 % (ref 0–1)
BILIRUB SERPL-MCNC: 0.26 MG/DL (ref 0.2–1)
BILIRUB UR QL STRIP: NEGATIVE
BUN SERPL-MCNC: 11 MG/DL (ref 5–25)
CALCIUM SERPL-MCNC: 8.5 MG/DL (ref 8.3–10.1)
CARDIAC TROPONIN I PNL SERPL HS: 6 NG/L
CARDIAC TROPONIN I PNL SERPL HS: 7 NG/L
CHLORIDE SERPL-SCNC: 104 MMOL/L (ref 100–108)
CLARITY UR: CLEAR
CO2 SERPL-SCNC: 27 MMOL/L (ref 21–32)
COLOR UR: YELLOW
CREAT SERPL-MCNC: 1.03 MG/DL (ref 0.6–1.3)
EOSINOPHIL # BLD AUTO: 0.23 THOUSAND/ΜL (ref 0–0.61)
EOSINOPHIL NFR BLD AUTO: 3 % (ref 0–6)
ERYTHROCYTE [DISTWIDTH] IN BLOOD BY AUTOMATED COUNT: 14 % (ref 11.6–15.1)
GFR SERPL CREATININE-BSD FRML MDRD: 92 ML/MIN/1.73SQ M
GLUCOSE SERPL-MCNC: 114 MG/DL (ref 65–140)
GLUCOSE UR STRIP-MCNC: NEGATIVE MG/DL
HCT VFR BLD AUTO: 43.7 % (ref 36.5–49.3)
HGB BLD-MCNC: 13.3 G/DL (ref 12–17)
HGB UR QL STRIP.AUTO: NEGATIVE
IMM GRANULOCYTES # BLD AUTO: 0.03 THOUSAND/UL (ref 0–0.2)
IMM GRANULOCYTES NFR BLD AUTO: 0 % (ref 0–2)
INR PPP: 1.19 (ref 0.84–1.19)
KETONES UR STRIP-MCNC: NEGATIVE MG/DL
LEUKOCYTE ESTERASE UR QL STRIP: NEGATIVE
LYMPHOCYTES # BLD AUTO: 1.96 THOUSANDS/ΜL (ref 0.6–4.47)
LYMPHOCYTES NFR BLD AUTO: 25 % (ref 14–44)
MCH RBC QN AUTO: 25.3 PG (ref 26.8–34.3)
MCHC RBC AUTO-ENTMCNC: 30.4 G/DL (ref 31.4–37.4)
MCV RBC AUTO: 83 FL (ref 82–98)
MONOCYTES # BLD AUTO: 0.54 THOUSAND/ΜL (ref 0.17–1.22)
MONOCYTES NFR BLD AUTO: 7 % (ref 4–12)
NEUTROPHILS # BLD AUTO: 4.88 THOUSANDS/ΜL (ref 1.85–7.62)
NEUTS SEG NFR BLD AUTO: 64 % (ref 43–75)
NITRITE UR QL STRIP: NEGATIVE
NRBC BLD AUTO-RTO: 0 /100 WBCS
NT-PROBNP SERPL-MCNC: 16 PG/ML
P AXIS: 116 DEGREES
PH UR STRIP.AUTO: 6.5 [PH]
PLATELET # BLD AUTO: 214 THOUSANDS/UL (ref 149–390)
PMV BLD AUTO: 11.4 FL (ref 8.9–12.7)
POTASSIUM SERPL-SCNC: 3.9 MMOL/L (ref 3.5–5.3)
PR INTERVAL: 138 MS
PROT SERPL-MCNC: 7.6 G/DL (ref 6.4–8.2)
PROT UR STRIP-MCNC: NEGATIVE MG/DL
PROTHROMBIN TIME: 14.8 SECONDS (ref 11.6–14.5)
QRS AXIS: 56 DEGREES
QRSD INTERVAL: 96 MS
QT INTERVAL: 374 MS
QTC INTERVAL: 415 MS
RBC # BLD AUTO: 5.26 MILLION/UL (ref 3.88–5.62)
SODIUM SERPL-SCNC: 137 MMOL/L (ref 136–145)
SP GR UR STRIP.AUTO: 1.02 (ref 1–1.03)
T WAVE AXIS: -24 DEGREES
UROBILINOGEN UR QL STRIP.AUTO: 0.2 E.U./DL
VENTRICULAR RATE: 74 BPM
WBC # BLD AUTO: 7.71 THOUSAND/UL (ref 4.31–10.16)

## 2022-02-08 PROCEDURE — 99285 EMERGENCY DEPT VISIT HI MDM: CPT | Performed by: EMERGENCY MEDICINE

## 2022-02-08 PROCEDURE — 80053 COMPREHEN METABOLIC PANEL: CPT | Performed by: EMERGENCY MEDICINE

## 2022-02-08 PROCEDURE — 85730 THROMBOPLASTIN TIME PARTIAL: CPT | Performed by: EMERGENCY MEDICINE

## 2022-02-08 PROCEDURE — 71275 CT ANGIOGRAPHY CHEST: CPT

## 2022-02-08 PROCEDURE — 99285 EMERGENCY DEPT VISIT HI MDM: CPT

## 2022-02-08 PROCEDURE — 81003 URINALYSIS AUTO W/O SCOPE: CPT | Performed by: EMERGENCY MEDICINE

## 2022-02-08 PROCEDURE — 85025 COMPLETE CBC W/AUTO DIFF WBC: CPT | Performed by: EMERGENCY MEDICINE

## 2022-02-08 PROCEDURE — 84484 ASSAY OF TROPONIN QUANT: CPT | Performed by: EMERGENCY MEDICINE

## 2022-02-08 PROCEDURE — 93010 ELECTROCARDIOGRAM REPORT: CPT | Performed by: INTERNAL MEDICINE

## 2022-02-08 PROCEDURE — 83880 ASSAY OF NATRIURETIC PEPTIDE: CPT | Performed by: EMERGENCY MEDICINE

## 2022-02-08 PROCEDURE — 36415 COLL VENOUS BLD VENIPUNCTURE: CPT | Performed by: EMERGENCY MEDICINE

## 2022-02-08 PROCEDURE — 85610 PROTHROMBIN TIME: CPT | Performed by: EMERGENCY MEDICINE

## 2022-02-08 PROCEDURE — 93005 ELECTROCARDIOGRAM TRACING: CPT

## 2022-02-08 PROCEDURE — 96360 HYDRATION IV INFUSION INIT: CPT

## 2022-02-08 PROCEDURE — G1004 CDSM NDSC: HCPCS

## 2022-02-08 PROCEDURE — 71045 X-RAY EXAM CHEST 1 VIEW: CPT

## 2022-02-08 RX ORDER — ACETAMINOPHEN 325 MG/1
650 TABLET ORAL ONCE
Status: COMPLETED | OUTPATIENT
Start: 2022-02-08 | End: 2022-02-08

## 2022-02-08 RX ADMIN — SODIUM CHLORIDE 1000 ML: 0.9 INJECTION, SOLUTION INTRAVENOUS at 09:30

## 2022-02-08 RX ADMIN — IOHEXOL 85 ML: 350 INJECTION, SOLUTION INTRAVENOUS at 09:38

## 2022-02-08 RX ADMIN — ACETAMINOPHEN 650 MG: 325 TABLET, FILM COATED ORAL at 09:29

## 2022-02-08 NOTE — ED PROVIDER NOTES
History  Chief Complaint   Patient presents with    Chest Pain     started 0715    Shortness of Breath     started 0715     Patient presents for evaluation chest pain  Pain is on the left side of his chest described as a tightness  Patient states this started around 0715 this morning  Patient states that woke him up sleep  Pain is worse with deep breath and palpation  Patient does not feel like a short of breath  Patient is on Xarelto a history of blood clots  Patient states he has not missed any doses but did not take it today as he is waiting on a refill  States last dose was yesterday  Patient also has an IVC filter  Denies any cough, fever, recent illness  No other apparent modifying factors for his symptoms  History provided by:  Patient   used: No    Chest Pain  Associated symptoms: shortness of breath    Associated symptoms: no abdominal pain, no back pain, no cough, no fever, no palpitations and not vomiting    Shortness of Breath  Associated symptoms: chest pain    Associated symptoms: no abdominal pain, no cough, no ear pain, no fever, no rash, no sore throat and no vomiting        Prior to Admission Medications   Prescriptions Last Dose Informant Patient Reported?  Taking?   docusate sodium (COLACE) 100 mg capsule  Self No No   Sig: Take 1 capsule (100 mg total) by mouth 2 (two) times a day   Patient not taking: Reported on 5/17/2021   ferrous sulfate 324 (65 Fe) mg Not Taking at Unknown time Self No No   Sig: Take 1 tablet (324 mg total) by mouth every other day   Patient not taking: Reported on 2/8/2022    rivaroxaban (XARELTO) 15 mg tablet  Self No No   Sig: Take 1 tablet (15 mg total) by mouth 2 (two) times a day with meals for 19 days   rivaroxaban (XARELTO) 20 mg tablet 2/7/2022 at Unknown time Self No Yes   Sig: Take 1 tablet (20 mg total) by mouth daily with breakfast      Facility-Administered Medications: None       Past Medical History:   Diagnosis Date    Deep vein thrombosis (DVT) (HCC)     Diverticulitis     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       Past Surgical History:   Procedure Laterality Date    COLON SURGERY      FEMUR SURGERY      IR DVT THROMBOLYSIS/THROMBECTOMY ILIAC/IVC WITH VENOGRAM  4/26/2021    IR IVC FILTER REMOVAL  6/21/2021    IR TPA LYSIS CHECK  4/27/2021    KNEE SURGERY Bilateral     SHOULDER SURGERY Left        Family History   Problem Relation Age of Onset    Deep vein thrombosis Father      I have reviewed and agree with the history as documented  E-Cigarette/Vaping    E-Cigarette Use Current Every Day User      E-Cigarette/Vaping Substances    Nicotine No     THC No     CBD No     Flavoring No     Other No     Unknown No      Social History     Tobacco Use    Smoking status: Former Smoker     Types: Cigarettes    Smokeless tobacco: Never Used   Vaping Use    Vaping Use: Every day   Substance Use Topics    Alcohol use: Not Currently    Drug use: Yes     Types: Marijuana     Comment: medical card       Review of Systems   Constitutional: Negative for chills and fever  HENT: Negative for ear pain and sore throat  Eyes: Negative for pain and visual disturbance  Respiratory: Positive for chest tightness and shortness of breath  Negative for cough  Cardiovascular: Positive for chest pain  Negative for palpitations  Gastrointestinal: Negative for abdominal pain and vomiting  Genitourinary: Negative for dysuria and hematuria  Musculoskeletal: Negative for arthralgias and back pain  Skin: Negative for color change and rash  Neurological: Negative for seizures and syncope  All other systems reviewed and are negative  Physical Exam  Physical Exam  Vitals and nursing note reviewed  Constitutional:       General: He is not in acute distress  Appearance: Normal appearance  HENT:      Head: Atraumatic        Right Ear: External ear normal       Left Ear: External ear normal       Nose: Nose normal       Mouth/Throat:      Mouth: Mucous membranes are moist       Pharynx: Oropharynx is clear  Eyes:      General: No scleral icterus  Conjunctiva/sclera: Conjunctivae normal    Cardiovascular:      Rate and Rhythm: Normal rate and regular rhythm  Pulses: Normal pulses  Pulmonary:      Effort: Pulmonary effort is normal  No respiratory distress  Breath sounds: Normal breath sounds  Chest:      Chest wall: Tenderness present  Abdominal:      General: Abdomen is flat  Bowel sounds are normal  There is no distension  Palpations: Abdomen is soft  Tenderness: There is no abdominal tenderness  There is no guarding or rebound  Musculoskeletal:         General: No deformity  Normal range of motion  Right lower leg: No edema  Left lower leg: No edema  Skin:     Capillary Refill: Capillary refill takes less than 2 seconds  Findings: No rash  Neurological:      General: No focal deficit present  Mental Status: He is alert and oriented to person, place, and time           Vital Signs  ED Triage Vitals   Temperature Pulse Respirations Blood Pressure SpO2   02/08/22 0812 02/08/22 0812 02/08/22 0812 02/08/22 0812 02/08/22 0812   98 7 °F (37 1 °C) 83 18 158/87 98 %      Temp src Heart Rate Source Patient Position - Orthostatic VS BP Location FiO2 (%)   -- 02/08/22 0812 02/08/22 0845 02/08/22 0845 --    Monitor Lying Right arm       Pain Score       02/08/22 0812       8           Vitals:    02/08/22 0815 02/08/22 0845 02/08/22 1023 02/08/22 1123   BP: 158/87 150/81 162/74 162/74   Pulse: 74 82 85 74   Patient Position - Orthostatic VS:  Lying Sitting Sitting         Visual Acuity      ED Medications  Medications   acetaminophen (TYLENOL) tablet 650 mg (650 mg Oral Given 2/8/22 0929)   sodium chloride 0 9 % bolus 1,000 mL (0 mL Intravenous Stopped 2/8/22 1030)   iohexol (OMNIPAQUE) 350 MG/ML injection (SINGLE-DOSE) 85 mL (85 mL Intravenous Given 2/8/22 0938) Diagnostic Studies  Results Reviewed     Procedure Component Value Units Date/Time    HS Troponin I 2hr [[id number]]  (Normal) Collected: 02/08/22 1033    Lab Status: Final result Specimen: Blood from Arm, Left Updated: 02/08/22 1105     hs TnI 2hr 6 ng/L      Delta 2hr hsTnI -1 ng/L     UA (URINE) with reflex to Scope [[id number]] Collected: 02/08/22 1033    Lab Status: Final result Specimen: Urine, Clean Catch Updated: 02/08/22 1039     Color, UA Yellow     Clarity, UA Clear     Specific [place], UA 1 020     pH, UA 6 5     Leukocytes, UA Negative     Nitrite, UA Negative     Protein, UA Negative mg/dl      Glucose, UA Negative mg/dl      Ketones, UA Negative mg/dl      Urobilinogen, UA 0 2 E U /dl      Bilirubin, UA Negative     Blood, UA Negative    NT-BNP PRO [[id number]]  (Normal) Collected: 02/08/22 0826    Lab Status: Final result Specimen: Blood from Arm, Left Updated: 02/08/22 0856     NT-proBNP 16 pg/mL     HS Troponin 0hr (reflex protocol) [[id number]]  (Normal) Collected: 02/08/22 0826    Lab Status: Final result Specimen: Blood from Arm, Left Updated: 02/08/22 0856     hs TnI 0hr 7 ng/L     Comprehensive metabolic panel [[id number]] Collected: 02/08/22 0826    Lab Status: Final result Specimen: Blood from Arm, Left Updated: 02/08/22 0851     Sodium 137 mmol/L      Potassium 3 9 mmol/L      Chloride 104 mmol/L      CO2 27 mmol/L      ANION GAP 6 mmol/L      BUN 11 mg/dL      Creatinine 1 03 mg/dL      Glucose 114 mg/dL      Calcium 8 5 mg/dL      AST 20 U/L      ALT 41 U/L      Alkaline Phosphatase 101 U/L      Total Protein 7 6 g/dL      Albumin 3 6 g/dL      Total Bilirubin 0 26 mg/dL      eGFR 92 ml/min/1 73sq m     Narrative:      Alex guidelines for Chronic Kidney Disease (CKD):     Stage 1 with normal or high GFR (GFR > 90 mL/min/1 73 square meters)    Stage 2 Mild CKD (GFR = 60-89 mL/min/1 73 square meters)    Stage 3A Moderate CKD (GFR = 45-59 mL/min/1 73 square meters)    Stage 3B Moderate CKD (GFR = 30-44 mL/min/1 73 square meters)    Stage 4 Severe CKD (GFR = 15-29 mL/min/1 73 square meters)    Stage 5 End Stage CKD (GFR <15 mL/min/1 73 square meters)  Note: GFR calculation is accurate only with a steady state creatinine    Protime-INR [[id number]]  (Abnormal) Collected: 02/08/22 0826    Lab Status: Final result Specimen: Blood from Arm, Left Updated: 02/08/22 0844     Protime 14 8 seconds      INR 1 19    APTT [[id number]]  (Normal) Collected: 02/08/22 0826    Lab Status: Final result Specimen: Blood from Arm, Left Updated: 02/08/22 0844     PTT 29 seconds     CBC and differential [[id number]]  (Abnormal) Collected: 02/08/22 0826    Lab Status: Final result Specimen: Blood from Arm, Left Updated: 02/08/22 0833     WBC 7 71 Thousand/uL      RBC 5 26 Million/uL      Hemoglobin 13 3 g/dL      Hematocrit 43 7 %      MCV 83 fL      MCH 25 3 pg      MCHC 30 4 g/dL      RDW 14 0 %      MPV 11 4 fL      Platelets 912 Thousands/uL      nRBC 0 /100 WBCs      Neutrophils Relative 64 %      Immat GRANS % 0 %      Lymphocytes Relative 25 %      Monocytes Relative 7 %      Eosinophils Relative 3 %      Basophils Relative 1 %      Neutrophils Absolute 4 88 Thousands/µL      Immature Grans Absolute 0 03 Thousand/uL      Lymphocytes Absolute 1 96 Thousands/µL      Monocytes Absolute 0 54 Thousand/µL      Eosinophils Absolute 0 23 Thousand/µL      Basophils Absolute 0 07 Thousands/µL                  CTA ED chest PE study   Final Result by Kimani Mtz MD (02/08 1049)      No acute pathology  No pulmonary embolus  Workstation performed: [id number]         XR chest 1 view portable   Final Result by Kimani Mtz MD (02/08 1042)      No acute cardiopulmonary disease                    Workstation performed: [id number]                    Procedures  Procedures         ED Course             HEART Risk Score      Most Recent Value   Heart Score Risk Calculator    History 0 Filed at: 02/08/2022 0915   ECG 1 Filed at: 02/08/2022 0915   Age 0 Filed at: 02/08/2022 0915   Risk Factors 1 Filed at: 02/08/2022 0915   Troponin 0 Filed at: 02/08/2022 0915   HEART Score 2 Filed at: 02/08/2022 0915                        SBIRT 20yo+      Most Recent Value   SBIRT (23 yo +)    In order to provide better care to our patients, we are screening all of our patients for alcohol and drug use  Would it be okay to ask you these screening questions? Yes Filed at: 02/08/2022 6772   Initial Alcohol Screen: US AUDIT-C     1  How often do you have a drink containing alcohol? 4 Filed at: 02/08/2022 0819   2  How many drinks containing alcohol do you have on a typical day you are drinking? 3 Filed at: 02/08/2022 0819   3a  Male UNDER 65: How often do you have five or more drinks on one occasion? 3 Filed at: 02/08/2022 0819   3b  FEMALE Any Age, or MALE 65+: How often do you have 4 or more drinks on one occassion? 0 Filed at: 02/08/2022 0819   Audit-C Score 10 Filed at: 02/08/2022 6845   Full Alcohol Screen: US AUDIT    4  How often during the last year have you found that you were not able to stop drinking once you had started? 0 Filed at: 02/08/2022 0819   5  How often during past year have you failed to do what was normally expected of you because of drinking? 0 Filed at: 02/08/2022 0819   6  How often in past year have you needed a first drink in the morning to get yourself going after a heavy drinking session? 0 Filed at: 02/08/2022 0819   7  How often in past year have you had feeling of guilt or remorse after drinking? 0 Filed at: 02/08/2022 0819   8  How often in past year have you been unable to remember what happened night before because you had been drinking? 0 Filed at: 02/08/2022 0819   9  Have you or someone else been injured as a result of your drinking? 0 Filed at: 02/08/2022 0837   10   Has a relative, friend, doctor or other health worker been concerned about your drinking and suggested you cut down?  0 Filed at: 02/08/2022 6756   GRACIE: How many times in the past year have you    Used an illegal drug or used a prescription medication for non-medical reasons? Never Filed at: 02/08/2022 9265          Wells' Criteria for PE      Most Recent Value   Wells' Criteria for PE    Clinical signs and symptoms of DVT 0 Filed at: 02/08/2022 8729   PE is primary diagnosis or equally likely 3 Filed at: 02/08/2022 0916   HR >100 0 Filed at: 02/08/2022 0916   Immobilization at least 3 days or Surgery in the previous 4 weeks 0 Filed at: 02/08/2022 4834   Previous, objectively diagnosed PE or DVT 1 5 Filed at: 02/08/2022 0916   Hemoptysis 0 Filed at: 02/08/2022 7556   Malignancy with treatment within 6 months or palliative 0 Filed at: 02/08/2022 3012   Wells' Criteria Total 4 5 Filed at: 02/08/2022 4394                MDM  Number of Diagnoses or Management Options  Chest pain  Diagnosis management comments: Pulse ox 99% on room air indicating adequate oxygenation  CXR: NAD as read by me    Discussed lab work and CT scan results with the patient  Advised follow-up with primary care physician         Amount and/or Complexity of Data Reviewed  Clinical lab tests: ordered and reviewed  Tests in the radiology section of CPT®: ordered and reviewed  Decide to obtain previous medical records or to obtain history from someone other than the patient: yes  Review and summarize past medical records: yes  Independent visualization of images, tracings, or specimens: yes    Patient Progress  Patient progress: stable      Disposition  Final diagnoses:   Chest pain     Time reflects when diagnosis was documented in both MDM as applicable and the Disposition within this note     Time User Action Codes Description Comment    2/8/2022 11:13 AM Cayden Kaba Add [R07 9] Chest pain       ED Disposition     ED Disposition Condition Date/Time Comment    Discharge Stable Tue Feb 8, 2022 11:13 AM Justa Henriquez discharge to home/self care             Follow-up Information     Follow up With Specialties Details Why Contact Info    Chanell Hunter MD  In 1 week  [address] [address]  [phone]            Discharge Medication List as of 2/8/2022 11:13 AM      CONTINUE these medications which have NOT CHANGED    Details   rivaroxaban (XARELTO) 20 mg tablet Take 1 tablet (20 mg total) by mouth daily with breakfast, Starting Thu 5/20/2021, Normal      docusate sodium (COLACE) 100 mg capsule Take 1 capsule (100 mg total) by mouth 2 (two) times a day, Starting Fri 4/30/2021, Normal      ferrous sulfate 324 (65 Fe) mg Take 1 tablet (324 mg total) by mouth every other day, Starting Fri 4/30/2021, Normal             No discharge procedures on file      PDMP Review     None          ED Provider  Electronically Signed by           Kelly aDmon DO  02/08/22 9589

## 2022-02-08 NOTE — ED PROCEDURE NOTE
PROCEDURE  ECG 12 Lead Documentation Only    Date/Time: 2/8/2022 8:16 AM  Performed by: Caitie Zavala DO  Authorized by: Caitie Zavala DO     ECG reviewed by me, the ED Provider: yes    Patient location:  ED  Previous ECG:     Previous ECG:  Compared to current    Similarity:  No change  Interpretation:     Interpretation: abnormal    Rate:     ECG rate:  74    ECG rate assessment: normal    Rhythm:     Rhythm: sinus rhythm    Ectopy:     Ectopy: none    ST segments:     ST segments:  Normal  T waves:     T waves: inverted      Inverted:  III, aVF, V5 and V6         Caitie Zavala DO  02/08/22 2404

## 2022-03-16 ENCOUNTER — OFFICE VISIT (OUTPATIENT)
Dept: URGENT CARE | Facility: CLINIC | Age: 37
End: 2022-03-16
Payer: COMMERCIAL

## 2022-03-16 VITALS
DIASTOLIC BLOOD PRESSURE: 90 MMHG | WEIGHT: 315 LBS | TEMPERATURE: 97.4 F | OXYGEN SATURATION: 98 % | BODY MASS INDEX: 42.66 KG/M2 | HEIGHT: 72 IN | RESPIRATION RATE: 16 BRPM | HEART RATE: 94 BPM | SYSTOLIC BLOOD PRESSURE: 137 MMHG

## 2022-03-16 DIAGNOSIS — R50.9 FEVER, UNSPECIFIED: ICD-10-CM

## 2022-03-16 DIAGNOSIS — B34.9 VIRAL INFECTION: Primary | ICD-10-CM

## 2022-03-16 PROCEDURE — 87636 SARSCOV2 & INF A&B AMP PRB: CPT | Performed by: PHYSICIAN ASSISTANT

## 2022-03-16 PROCEDURE — 99214 OFFICE O/P EST MOD 30 MIN: CPT | Performed by: PHYSICIAN ASSISTANT

## 2022-03-16 NOTE — LETTER
[facility]  [address] [place] [id number]  [phone]  Dept: [phone]    March 16, 2022    Patient: Joseph Murphy  YOB: 1985    Joseph Murphy was seen and evaluated at our [facility]  Please note if Covid and Flu tests are negative, they may return to work when fever free for 24 hours without the use of a fever reducing agent  If Covid or Flu test is positive, they may return to work on 03/19/2022, as this is 5 days from the onset of symptoms  Upon return, they must then adhere to strict masking for an additional 5 days      Sincerely,    Harry Caicedo PA-C
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
## 2022-03-17 LAB
FLUAV RNA RESP QL NAA+PROBE: NEGATIVE
FLUBV RNA RESP QL NAA+PROBE: NEGATIVE
SARS-COV-2 RNA RESP QL NAA+PROBE: NEGATIVE

## 2022-05-28 ENCOUNTER — APPOINTMENT (EMERGENCY)
Dept: RADIOLOGY | Facility: HOSPITAL | Age: 37
End: 2022-05-28
Payer: COMMERCIAL

## 2022-05-28 ENCOUNTER — HOSPITAL ENCOUNTER (EMERGENCY)
Facility: HOSPITAL | Age: 37
Discharge: HOME | End: 2022-05-28
Attending: EMERGENCY MEDICINE
Payer: COMMERCIAL

## 2022-05-28 VITALS
HEART RATE: 84 BPM | SYSTOLIC BLOOD PRESSURE: 155 MMHG | RESPIRATION RATE: 24 BRPM | DIASTOLIC BLOOD PRESSURE: 70 MMHG | BODY MASS INDEX: 43.77 KG/M2 | OXYGEN SATURATION: 100 % | WEIGHT: 315 LBS | TEMPERATURE: 97.5 F

## 2022-05-28 DIAGNOSIS — R07.9 CHEST PAIN, UNSPECIFIED: Primary | ICD-10-CM

## 2022-05-28 LAB
ALBUMIN SERPL BCP-MCNC: 3.8 G/DL (ref 3.5–5)
ALP SERPL-CCNC: 103 U/L (ref 46–116)
ALT SERPL W P-5'-P-CCNC: 39 U/L (ref 12–78)
ANION GAP SERPL CALCULATED.3IONS-SCNC: 9 MMOL/L (ref 4–13)
AST SERPL W P-5'-P-CCNC: 28 U/L (ref 5–45)
BASOPHILS # BLD AUTO: 0.08 THOUSANDS/ΜL (ref 0–0.1)
BASOPHILS NFR BLD AUTO: 1 % (ref 0–1)
BILIRUB SERPL-MCNC: 0.21 MG/DL (ref 0.2–1)
BUN SERPL-MCNC: 14 MG/DL (ref 5–25)
CALCIUM SERPL-MCNC: 9.3 MG/DL (ref 8.3–10.1)
CARDIAC TROPONIN I PNL SERPL HS: 3 NG/L
CHLORIDE SERPL-SCNC: 104 MMOL/L (ref 100–108)
CO2 SERPL-SCNC: 25 MMOL/L (ref 21–32)
CREAT SERPL-MCNC: 1.15 MG/DL (ref 0.6–1.3)
D DIMER PPP FEU-MCNC: 0.33 UG/ML FEU
EOSINOPHIL # BLD AUTO: 0.12 THOUSAND/ΜL (ref 0–0.61)
EOSINOPHIL NFR BLD AUTO: 1 % (ref 0–6)
ERYTHROCYTE [DISTWIDTH] IN BLOOD BY AUTOMATED COUNT: 14.4 % (ref 11.6–15.1)
GFR SERPL CREATININE-BSD FRML MDRD: 80 ML/MIN/1.73SQ M
GLUCOSE SERPL-MCNC: 102 MG/DL (ref 65–140)
HCT VFR BLD AUTO: 45.2 % (ref 36.5–49.3)
HGB BLD-MCNC: 13.9 G/DL (ref 12–17)
IMM GRANULOCYTES # BLD AUTO: 0.03 THOUSAND/UL (ref 0–0.2)
IMM GRANULOCYTES NFR BLD AUTO: 0 % (ref 0–2)
LIPASE SERPL-CCNC: 59 U/L (ref 73–393)
LYMPHOCYTES # BLD AUTO: 2.62 THOUSANDS/ΜL (ref 0.6–4.47)
LYMPHOCYTES NFR BLD AUTO: 28 % (ref 14–44)
MCH RBC QN AUTO: 26 PG (ref 26.8–34.3)
MCHC RBC AUTO-ENTMCNC: 30.8 G/DL (ref 31.4–37.4)
MCV RBC AUTO: 85 FL (ref 82–98)
MONOCYTES # BLD AUTO: 0.62 THOUSAND/ΜL (ref 0.17–1.22)
MONOCYTES NFR BLD AUTO: 7 % (ref 4–12)
NEUTROPHILS # BLD AUTO: 5.96 THOUSANDS/ΜL (ref 1.85–7.62)
NEUTS SEG NFR BLD AUTO: 63 % (ref 43–75)
NRBC BLD AUTO-RTO: 0 /100 WBCS
PLATELET # BLD AUTO: 233 THOUSANDS/UL (ref 149–390)
PMV BLD AUTO: 11.5 FL (ref 8.9–12.7)
POTASSIUM SERPL-SCNC: 5.1 MMOL/L (ref 3.5–5.3)
PROT SERPL-MCNC: 8.3 G/DL (ref 6.4–8.2)
RBC # BLD AUTO: 5.35 MILLION/UL (ref 3.88–5.62)
SODIUM SERPL-SCNC: 138 MMOL/L (ref 136–145)
WBC # BLD AUTO: 9.43 THOUSAND/UL (ref 4.31–10.16)

## 2022-05-28 PROCEDURE — 99285 EMERGENCY DEPT VISIT HI MDM: CPT

## 2022-05-28 PROCEDURE — 84484 ASSAY OF TROPONIN QUANT: CPT | Performed by: EMERGENCY MEDICINE

## 2022-05-28 PROCEDURE — 99285 EMERGENCY DEPT VISIT HI MDM: CPT | Performed by: EMERGENCY MEDICINE

## 2022-05-28 PROCEDURE — 80053 COMPREHEN METABOLIC PANEL: CPT | Performed by: EMERGENCY MEDICINE

## 2022-05-28 PROCEDURE — 85025 COMPLETE CBC W/AUTO DIFF WBC: CPT | Performed by: EMERGENCY MEDICINE

## 2022-05-28 PROCEDURE — 71045 X-RAY EXAM CHEST 1 VIEW: CPT

## 2022-05-28 PROCEDURE — 93005 ELECTROCARDIOGRAM TRACING: CPT

## 2022-05-28 PROCEDURE — 83690 ASSAY OF LIPASE: CPT | Performed by: EMERGENCY MEDICINE

## 2022-05-28 PROCEDURE — 85379 FIBRIN DEGRADATION QUANT: CPT | Performed by: EMERGENCY MEDICINE

## 2022-05-28 PROCEDURE — 36415 COLL VENOUS BLD VENIPUNCTURE: CPT | Performed by: EMERGENCY MEDICINE

## 2022-05-28 RX ORDER — ACETAMINOPHEN 325 MG/1
650 TABLET ORAL ONCE
Status: COMPLETED | OUTPATIENT
Start: 2022-05-28 | End: 2022-05-28

## 2022-05-28 RX ADMIN — ACETAMINOPHEN 650 MG: 325 TABLET ORAL at 18:21

## 2022-05-28 NOTE — DISCHARGE INSTRUCTIONS
Return to the ER for further concerns or worsening symptoms  Follow up with your primary care physician and cardiology in 1-2 days

## 2022-05-28 NOTE — ED PROVIDER NOTES
History  Chief Complaint   Patient presents with    Chest Pain     Mid sternal chest pain since about 11pm last night, some discomfort with deep breath, hx of dvt, had filter removed about a year ago     Patient ER with complaint of constant substernal chest pain that began approximately 11:00 p m  Last night  Patient states that the pain initially began as a sharp pain, and is now more of an ache  Patient has a history of DVT of unknown etiology and is compliant with Xarelto as prescribed  History provided by:  Patient   used: No    Chest Pain  Pain location:  Substernal area  Pain quality: aching    Pain radiates to:  Does not radiate  Pain radiates to the back: no    Pain severity:  Mild  Onset quality:  Sudden  Timing:  Constant  Progression:  Unchanged  Chronicity:  New  Relieved by:  Nothing  Worsened by:  Nothing tried  Ineffective treatments:  None tried  Associated symptoms: no abdominal pain, no back pain, no cough, no fever, no nausea, no palpitations, no shortness of breath and not vomiting        Prior to Admission Medications   Prescriptions Last Dose Informant Patient Reported?  Taking?   docusate sodium (COLACE) 100 mg capsule  Self No No   Sig: Take 1 capsule (100 mg total) by mouth 2 (two) times a day   Patient not taking: Reported on 5/17/2021   ferrous sulfate 324 (65 Fe) mg  Self No No   Sig: Take 1 tablet (324 mg total) by mouth every other day   Patient not taking: Reported on 2/8/2022    rivaroxaban (XARELTO) 20 mg tablet 5/28/2022 at Unknown time Self No Yes   Sig: Take 1 tablet (20 mg total) by mouth daily with breakfast      Facility-Administered Medications: None       Past Medical History:   Diagnosis Date    [address] ([address]T) [address] [address]     Diverticulitis     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       Past Surgical History:   Procedure Laterality Date    COLON SURGERY      FEMUR SURGERY      IR DVT THROMBOLYSIS/THROMBECTOMY ILIAC/IVC WITH VENOGRAM 4/26/2021    IR IVC FILTER REMOVAL  6/21/2021    IR TPA LYSIS CHECK  4/27/2021    KNEE SURGERY Bilateral     SHOULDER SURGERY Left        Family History   Problem Relation Age of Onset    Deep vein thrombosis Father      I have reviewed and agree with the history as documented  E-Cigarette/Vaping    E-Cigarette Use Former User      E-Cigarette/Vaping Substances    Nicotine No     THC No     CBD No     Flavoring No     Other No     Unknown No      Social History     Tobacco Use    Smoking status: Current Some Day Smoker     Types: Cigarettes    Smokeless tobacco: Never Used   Vaping Use    Vaping Use: Former   Substance Use Topics    Alcohol use: Yes     Comment: occas    Drug use: Yes     Types: Marijuana     Comment: medical card       Review of Systems   Constitutional: Negative for chills and fever  Respiratory: Negative for cough, shortness of breath and wheezing  Cardiovascular: Positive for chest pain  Negative for palpitations  Gastrointestinal: Negative for abdominal pain, constipation, diarrhea, nausea and vomiting  Genitourinary: Negative for dysuria, flank pain, hematuria and urgency  Musculoskeletal: Negative for back pain  Skin: Negative for color change and rash  All other systems reviewed and are negative  Physical Exam  Physical Exam  Vitals and nursing note reviewed  Constitutional:       Appearance: He is well-developed  HENT:      Head: Normocephalic and atraumatic  Eyes:      Pupils: Pupils are equal, round, and reactive to light  Cardiovascular:      Rate and Rhythm: Normal rate and regular rhythm  Heart sounds: Normal heart sounds  Pulmonary:      Effort: Pulmonary effort is normal  No tachypnea or accessory muscle usage  Breath sounds: Normal breath sounds  No decreased breath sounds  Abdominal:      General: Bowel sounds are normal  There is no distension  Palpations: Abdomen is soft  There is no mass  Tenderness:  There is no abdominal tenderness  There is no guarding or rebound  Musculoskeletal:      Right lower leg: Edema present  Left lower leg: Edema present  Skin:     General: Skin is warm and dry  Capillary Refill: Capillary refill takes less than 2 seconds  Neurological:      General: No focal deficit present  Mental Status: He is alert and oriented to person, place, and time  Psychiatric:         Mood and Affect: Mood is anxious  Behavior: Behavior normal  Behavior is not agitated  Thought Content:  Thought content normal          Judgment: Judgment normal          Vital Signs  ED Triage Vitals [05/28/22 1655]   Temperature Pulse Respirations Blood Pressure SpO2   97 5 °F (36 4 °C) 88 (!) 24 169/89 100 %      Temp Source Heart Rate Source Patient Position - Orthostatic VS BP Location FiO2 (%)   Tympanic Monitor Sitting Right arm --      Pain Score       8           Vitals:    05/28/22 1655 05/28/22 1715 05/28/22 1847   BP: 169/89 156/87 155/70   Pulse: 88 90 84   Patient Position - Orthostatic VS: Sitting Lying Sitting         Visual Acuity      ED Medications  Medications   acetaminophen (TYLENOL) tablet 650 mg (650 mg Oral Given 5/28/22 1821)       Diagnostic Studies  Results Reviewed     Procedure Component Value Units Date/Time    Lipase [[id number]]  (Abnormal) Collected: 05/28/22 1711    Lab Status: Final result Specimen: Blood from Arm, Left Updated: 05/28/22 1803     Lipase 59 u/L     HS Troponin 0hr (reflex protocol) [[id number]]  (Normal) Collected: 05/28/22 1711    Lab Status: Final result Specimen: Blood from Arm, Left Updated: 05/28/22 1742     hs TnI 0hr 3 ng/L     Comprehensive metabolic panel [[id number]]  (Abnormal) Collected: 05/28/22 1711    Lab Status: Final result Specimen: Blood from Arm, Left Updated: 05/28/22 1734     Sodium 138 mmol/L      Potassium 5 1 mmol/L      Chloride 104 mmol/L      CO2 25 mmol/L      ANION GAP 9 mmol/L      BUN 14 mg/dL      Creatinine 1 15 mg/dL      Glucose 102 mg/dL      Calcium 9 3 mg/dL      AST 28 U/L      ALT 39 U/L      Alkaline Phosphatase 103 U/L      Total Protein 8 3 g/dL      Albumin 3 8 g/dL      Total Bilirubin 0 21 mg/dL      eGFR 80 ml/min/1 73sq m     Narrative:      National Kidney Disease Foundation guidelines for Chronic Kidney Disease (CKD):     Stage 1 with normal or high GFR (GFR > 90 mL/min/1 73 square meters)    Stage 2 Mild CKD (GFR = 60-89 mL/min/1 73 square meters)    Stage 3A Moderate CKD (GFR = 45-59 mL/min/1 73 square meters)    Stage 3B Moderate CKD (GFR = 30-44 mL/min/1 73 square meters)    Stage 4 Severe CKD (GFR = 15-29 mL/min/1 73 square meters)    Stage 5 End Stage CKD (GFR <15 mL/min/1 73 square meters)  Note: GFR calculation is accurate only with a steady state creatinine    D-Dimer [[id number]]  (Normal) Collected: 05/28/22 1711    Lab Status: Final result Specimen: Blood from Arm, Left Updated: 05/28/22 1731     D-Dimer, Quant 0 33 ug/ml FEU     CBC and differential [[id number]]  (Abnormal) Collected: 05/28/22 1711    Lab Status: Final result Specimen: Blood from Arm, Left Updated: 05/28/22 1716     WBC 9 43 Thousand/uL      RBC 5 35 Million/uL      Hemoglobin 13 9 g/dL      Hematocrit 45 2 %      MCV 85 fL      MCH 26 0 pg      MCHC 30 8 g/dL      RDW 14 4 %      MPV 11 5 fL      Platelets 877 Thousands/uL      nRBC 0 /100 WBCs      Neutrophils Relative 63 %      Immat GRANS % 0 %      Lymphocytes Relative 28 %      Monocytes Relative 7 %      Eosinophils Relative 1 %      Basophils Relative 1 %      Neutrophils Absolute 5 96 Thousands/µL      Immature Grans Absolute 0 03 Thousand/uL      Lymphocytes Absolute 2 62 Thousands/µL      Monocytes Absolute 0 62 Thousand/µL      Eosinophils Absolute 0 12 Thousand/µL      Basophils Absolute 0 08 Thousands/µL                  XR chest 1 view portable   ED Interpretation by Clarice Mcleod DO (05/28 1755)   nad      Final Result by Kitty Kearns MD (05/29 3241)      No acute cardiopulmonary disease  Workstation performed: [id number]                    Procedures  ECG 12 Lead Documentation Only    Date/Time: 5/28/2022 4:56 PM  Performed by: Soren Burgos DO  Authorized by: Soren Burgos DO     Indications / Diagnosis:  Cp  ECG reviewed by me, the ED Provider: yes    Patient location:  ED  Previous ECG:     Previous ECG:  Compared to current    Similarity:  No change    Comparison to cardiac monitor: Yes    Interpretation:     Interpretation: non-specific    Rate:     ECG rate:  80bpm    ECG rate assessment: normal    Rhythm:     Rhythm: sinus rhythm    Ectopy:     Ectopy: none    QRS:     QRS axis:  Normal    QRS intervals:  Normal  Conduction:     Conduction: normal    ST segments:     ST segments:  Normal  T waves:     T waves: inverted      Inverted:  V5, V6, II, III and aVF             ED Course  ED Course as of 05/31/22 2042   Sat May 28, 2022   1809 ECG 12 lead             HEART Risk Score    Flowsheet Row Most Recent Value   Heart Score Risk Calculator    History 1 Filed at: 05/28/2022 1816   ECG 0 Filed at: 05/28/2022 1816   Age 0 Filed at: 05/28/2022 1816   Risk Factors 1 Filed at: 05/28/2022 1816   Troponin 0 Filed at: 05/28/2022 1816   HEART Score 2 Filed at: 05/28/2022 1816                        SBIRT 22yo+    Flowsheet Row Most Recent Value   SBIRT (25 yo +)    In order to provide better care to our patients, we are screening all of our patients for alcohol and drug use  Would it be okay to ask you these screening questions?  No Filed at: 05/28/2022 1715                    MDM  Number of Diagnoses or Management Options  Chest pain, unspecified: new and requires workup     Amount and/or Complexity of Data Reviewed  Clinical lab tests: ordered and reviewed  Tests in the radiology section of CPT®: ordered and reviewed    Risk of Complications, Morbidity, and/or Mortality  Presenting problems: high  Diagnostic procedures: high    Patient Progress  Patient progress: stable      Disposition  Final diagnoses:   Chest pain, unspecified     Time reflects when diagnosis was documented in both MDM as applicable and the Disposition within this note     Time User Action Codes Description Comment    5/28/2022  6:35 PM Gina Zelaya Add [R07 9] Chest pain, unspecified       ED Disposition     ED Disposition   Discharge    Condition   Stable    Date/Time   Sat May 28, 2022  6:35 PM    Comment   Daja Arechiga discharge to home/self care  Follow-up Information     Follow up With Specialties Details Why Contact Info Additional Information    Magnolia Quinones MD  Schedule an appointment as soon as possible for a visit in 2 days for follow up [address] [facility]       [facility] Schedule an appointment as soon as possible for a visit in 2 days for follow up [address] [facility] [address] [facility], [address] [facility] [address], [address] [place], [place], [address] [phone]          Discharge Medication List as of 5/28/2022  6:41 PM      CONTINUE these medications which have NOT CHANGED    Details   docusate sodium (COLACE) 100 mg capsule Take 1 capsule (100 mg total) by mouth 2 (two) times a day, Starting Fri 4/30/2021, Normal      ferrous sulfate 324 (65 Fe) mg Take 1 tablet (324 mg total) by mouth every other day, Starting Fri 4/30/2021, Normal      rivaroxaban (XARELTO) 20 mg tablet Take 1 tablet (20 mg total) by mouth daily with breakfast, Starting Thu 5/20/2021, Normal             No discharge procedures on file      PDMP Review     None          ED Provider  Electronically Signed by           Bridger Calvo DO  05/31/22 2042

## 2022-05-28 NOTE — Clinical Note
Maxi Zaheer was seen and treated in our emergency department on 5/28/2022  Diagnosis:     Cr Yepez  may return to work on return date  He may return on this date: 05/29/2022         If you have any questions or concerns, please don't hesitate to call        Vanessa Fisher DO    ______________________________           _______________          _______________  Hospital Representative                              Date                                Time

## 2022-05-30 LAB
ATRIAL RATE: 80 BPM
P AXIS: 41 DEGREES
PR INTERVAL: 134 MS
QRS AXIS: 5 DEGREES
QRSD INTERVAL: 80 MS
QT INTERVAL: 368 MS
QTC INTERVAL: 424 MS
T WAVE AXIS: -7 DEGREES
VENTRICULAR RATE: 80 BPM

## 2022-05-30 PROCEDURE — 93010 ELECTROCARDIOGRAM REPORT: CPT | Performed by: INTERNAL MEDICINE

## 2022-08-09 ENCOUNTER — OFFICE VISIT (OUTPATIENT)
Dept: URGENT CARE | Facility: CLINIC | Age: 37
End: 2022-08-09
Payer: COMMERCIAL

## 2022-08-09 VITALS
RESPIRATION RATE: 20 BRPM | WEIGHT: 315 LBS | HEART RATE: 85 BPM | TEMPERATURE: 98.2 F | OXYGEN SATURATION: 98 % | HEIGHT: 72 IN | BODY MASS INDEX: 42.66 KG/M2

## 2022-08-09 DIAGNOSIS — J06.9 VIRAL URI: Primary | ICD-10-CM

## 2022-08-09 PROCEDURE — 99213 OFFICE O/P EST LOW 20 MIN: CPT | Performed by: PHYSICIAN ASSISTANT

## 2022-08-09 NOTE — LETTER
August 9, 2022     Patient: Norma Saldaña   YOB: 1985   Date of Visit: 8/9/2022       To Whom It May Concern: It is my medical opinion that Norma Saldaña may return to work on 08/10/2022  Please excuse his absence on 08/09/2022  If you have any questions or concerns, please don't hesitate to call           Sincerely,        [facility] PROVIDER    CC: No Recipients

## 2022-08-09 NOTE — PROGRESS NOTES
[address] [facility]        NAME: Sabino Barr is a 39 y o  male  : 1985    MRN: [id number]  DATE: 2022  TIME: 11:24 AM    Assessment and Plan   Viral URI [J06 [id number]]  1  Viral URI           Patient Instructions     Patient Instructions   Recommend continuing over-the-counter cough and cold medication as needed  Suspect symptoms are most likely viral in nature and should resolve on own in next few days  Provided note for patient to return to work tomorrow  Follow up with PCP in 3-5 days  Proceed to  ER if symptoms worsen  Chief Complaint     Chief Complaint   Patient presents with    Cold Like Symptoms     Cough,sneezing, congestion, sinus pain x 2 days          History of Present Illness       Patient is a 69-year-old male presenting today with cold symptoms x3 days  Patient notes the last few days he has been experiencing some nasal congestion, sinus pressure and a cough, took some Sudafed last night which did provide some relief, notes that he is currently missing work today due to his symptoms and is needing a note before returning, denies any known sick contacts  Denies fever, chills, chest tightness, SOB  Review of Systems   Review of Systems   Constitutional: Negative for chills and fever  HENT: Positive for congestion and sinus pressure  Negative for ear pain, sore throat and trouble swallowing  Eyes: Negative for pain  Respiratory: Positive for cough  Negative for chest tightness and shortness of breath  Cardiovascular: Negative for chest pain  Gastrointestinal: Negative for abdominal pain  Musculoskeletal: Negative for myalgias  Skin: Negative for rash  Neurological: Negative for headaches           Current Medications       Current Outpatient Medications:     rivaroxaban (XARELTO) 20 mg tablet, Take 1 tablet (20 mg total) by mouth daily with breakfast, Disp: 30 tablet, Rfl: 1    docusate sodium (COLACE) 100 mg capsule, Take 1 capsule (100 mg total) by mouth 2 (two) times a day (Patient not taking: Reported on 5/17/2021), Disp: 10 capsule, Rfl: 0    ferrous sulfate 324 (65 Fe) mg, Take 1 tablet (324 mg total) by mouth every other day (Patient not taking: Reported on 2/8/2022 ), Disp: 60 tablet, Rfl: 0    Current Allergies     Allergies as of 08/09/2022 - Reviewed 08/09/2022   Allergen Reaction Noted    Codeine Hives 02/08/2022            The following portions of the patient's history were reviewed and updated as appropriate: allergies, current medications, past family history, past medical history, past social history, past surgical history and problem list      Past Medical History:   Diagnosis Date    Deep vein thrombosis (DVT) ([facility] [address] )     Diverticulitis     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       Past Surgical History:   Procedure Laterality Date    COLON SURGERY      FEMUR SURGERY      IR DVT THROMBOLYSIS/THROMBECTOMY ILIAC/IVC WITH VENOGRAM  4/26/2021    IR IVC FILTER REMOVAL  6/21/2021    IR TPA LYSIS CHECK  4/27/2021    KNEE SURGERY Bilateral     SHOULDER SURGERY Left        Family History   Problem Relation Age of Onset    Deep vein thrombosis Father          Medications have been verified  Objective   Pulse 85   Temp 98 2 °F (36 8 °C)   Resp 20   Ht 6' (1 829 m)   Wt (!) 148 kg (326 lb)   SpO2 98%   BMI 44 21 kg/m²        Physical Exam     Physical Exam  Vitals reviewed  Constitutional:       General: He is not in acute distress  Appearance: Normal appearance  He is well-developed  He is not toxic-appearing  Comments: Patient appears well and in good spirits   HENT:      Head: Normocephalic and atraumatic  Right Ear: Tympanic membrane, ear canal and external ear normal       Left Ear: Tympanic membrane, ear canal and external ear normal       Nose: Congestion present  Mouth/Throat:      Mouth: Mucous membranes are moist       Pharynx: Oropharynx is clear     Eyes:      Conjunctiva/sclera: Conjunctivae normal    Cardiovascular:      Rate and Rhythm: Normal rate and regular rhythm  Pulses: Normal pulses  Heart sounds: Normal heart sounds  Pulmonary:      Effort: Pulmonary effort is normal       Breath sounds: Normal breath sounds  Musculoskeletal:      Cervical back: Normal range of motion  No tenderness  Lymphadenopathy:      Cervical: No cervical adenopathy  Skin:     General: Skin is warm  Capillary Refill: Capillary refill takes less than 2 seconds  Neurological:      General: No focal deficit present  Mental Status: He is alert and oriented to person, place, and time

## 2022-08-09 NOTE — PATIENT INSTRUCTIONS
Recommend continuing over-the-counter cough and cold medication as needed  Suspect symptoms are most likely viral in nature and should resolve on own in next few days  Provided note for patient to return to work tomorrow

## 2023-02-13 ENCOUNTER — OFFICE VISIT (OUTPATIENT)
Dept: URGENT CARE | Facility: CLINIC | Age: 38
End: 2023-02-13

## 2023-02-13 ENCOUNTER — APPOINTMENT (EMERGENCY)
Dept: RADIOLOGY | Facility: HOSPITAL | Age: 38
End: 2023-02-13

## 2023-02-13 ENCOUNTER — HOSPITAL ENCOUNTER (EMERGENCY)
Facility: HOSPITAL | Age: 38
Discharge: HOME | End: 2023-02-13
Attending: EMERGENCY MEDICINE

## 2023-02-13 VITALS
HEART RATE: 80 BPM | SYSTOLIC BLOOD PRESSURE: 166 MMHG | DIASTOLIC BLOOD PRESSURE: 94 MMHG | OXYGEN SATURATION: 98 % | TEMPERATURE: 98.2 F | RESPIRATION RATE: 16 BRPM

## 2023-02-13 VITALS
RESPIRATION RATE: 16 BRPM | HEART RATE: 86 BPM | SYSTOLIC BLOOD PRESSURE: 148 MMHG | DIASTOLIC BLOOD PRESSURE: 94 MMHG | BODY MASS INDEX: 44.76 KG/M2 | OXYGEN SATURATION: 98 % | WEIGHT: 315 LBS | TEMPERATURE: 97.8 F

## 2023-02-13 DIAGNOSIS — R10.32 LLQ PAIN: Primary | ICD-10-CM

## 2023-02-13 DIAGNOSIS — R10.9 ABDOMINAL PAIN: Primary | ICD-10-CM

## 2023-02-13 DIAGNOSIS — Z87.19 HISTORY OF COLONIC DIVERTICULITIS: ICD-10-CM

## 2023-02-13 DIAGNOSIS — K59.00 CONSTIPATION: ICD-10-CM

## 2023-02-13 LAB
ALBUMIN SERPL BCP-MCNC: 3.8 G/DL (ref 3.5–5)
ALP SERPL-CCNC: 95 U/L (ref 46–116)
ALT SERPL W P-5'-P-CCNC: 39 U/L (ref 12–78)
AMPHETAMINES SERPL QL SCN: NEGATIVE
ANION GAP SERPL CALCULATED.3IONS-SCNC: 7 MMOL/L (ref 4–13)
AST SERPL W P-5'-P-CCNC: 27 U/L (ref 5–45)
BARBITURATES UR QL: NEGATIVE
BASOPHILS # BLD AUTO: 0.08 THOUSANDS/ÂΜL (ref 0–0.1)
BASOPHILS NFR BLD AUTO: 1 % (ref 0–1)
BENZODIAZ UR QL: NEGATIVE
BILIRUB SERPL-MCNC: 0.18 MG/DL (ref 0.2–1)
BILIRUB UR QL STRIP: NEGATIVE
BUN SERPL-MCNC: 12 MG/DL (ref 5–25)
CALCIUM SERPL-MCNC: 8.7 MG/DL (ref 8.3–10.1)
CHLORIDE SERPL-SCNC: 107 MMOL/L (ref 96–108)
CLARITY UR: CLEAR
CO2 SERPL-SCNC: 28 MMOL/L (ref 21–32)
COCAINE UR QL: NEGATIVE
COLOR UR: YELLOW
CREAT SERPL-MCNC: 1.15 MG/DL (ref 0.6–1.3)
EOSINOPHIL # BLD AUTO: 0.12 THOUSAND/ÂΜL (ref 0–0.61)
EOSINOPHIL NFR BLD AUTO: 1 % (ref 0–6)
ERYTHROCYTE [DISTWIDTH] IN BLOOD BY AUTOMATED COUNT: 14.8 % (ref 11.6–15.1)
FLUAV RNA RESP QL NAA+PROBE: NEGATIVE
FLUBV RNA RESP QL NAA+PROBE: NEGATIVE
GFR SERPL CREATININE-BSD FRML MDRD: 80 ML/MIN/1.73SQ M
GLUCOSE SERPL-MCNC: 102 MG/DL (ref 65–140)
GLUCOSE UR STRIP-MCNC: NEGATIVE MG/DL
HCT VFR BLD AUTO: 43.2 % (ref 36.5–49.3)
HGB BLD-MCNC: 13.5 G/DL (ref 12–17)
HGB UR QL STRIP.AUTO: NEGATIVE
IMM GRANULOCYTES # BLD AUTO: 0.02 THOUSAND/UL (ref 0–0.2)
IMM GRANULOCYTES NFR BLD AUTO: 0 % (ref 0–2)
KETONES UR STRIP-MCNC: NEGATIVE MG/DL
LEUKOCYTE ESTERASE UR QL STRIP: NEGATIVE
LIPASE SERPL-CCNC: 54 U/L (ref 73–393)
LYMPHOCYTES # BLD AUTO: 2.34 THOUSANDS/ÂΜL (ref 0.6–4.47)
LYMPHOCYTES NFR BLD AUTO: 28 % (ref 14–44)
MAGNESIUM SERPL-MCNC: 2.1 MG/DL (ref 1.6–2.6)
MCH RBC QN AUTO: 26.2 PG (ref 26.8–34.3)
MCHC RBC AUTO-ENTMCNC: 31.3 G/DL (ref 31.4–37.4)
MCV RBC AUTO: 84 FL (ref 82–98)
METHADONE UR QL: NEGATIVE
MONOCYTES # BLD AUTO: 0.49 THOUSAND/ÂΜL (ref 0.17–1.22)
MONOCYTES NFR BLD AUTO: 6 % (ref 4–12)
NEUTROPHILS # BLD AUTO: 5.38 THOUSANDS/ÂΜL (ref 1.85–7.62)
NEUTS SEG NFR BLD AUTO: 64 % (ref 43–75)
NITRITE UR QL STRIP: NEGATIVE
NRBC BLD AUTO-RTO: 0 /100 WBCS
OPIATES UR QL SCN: NEGATIVE
OXYCODONE+OXYMORPHONE UR QL SCN: NEGATIVE
PCP UR QL: NEGATIVE
PH UR STRIP.AUTO: 6 [PH]
PLATELET # BLD AUTO: 209 THOUSANDS/UL (ref 149–390)
PMV BLD AUTO: 12.1 FL (ref 8.9–12.7)
POTASSIUM SERPL-SCNC: 4.5 MMOL/L (ref 3.5–5.3)
PROT SERPL-MCNC: 7.7 G/DL (ref 6.4–8.4)
PROT UR STRIP-MCNC: NEGATIVE MG/DL
RBC # BLD AUTO: 5.15 MILLION/UL (ref 3.88–5.62)
RSV RNA RESP QL NAA+PROBE: NEGATIVE
SARS-COV-2 RNA RESP QL NAA+PROBE: NEGATIVE
SODIUM SERPL-SCNC: 142 MMOL/L (ref 135–147)
SP GR UR STRIP.AUTO: 1.01 (ref 1–1.03)
THC UR QL: POSITIVE
UROBILINOGEN UR QL STRIP.AUTO: 0.2 E.U./DL
WBC # BLD AUTO: 8.43 THOUSAND/UL (ref 4.31–10.16)

## 2023-02-13 RX ORDER — DICYCLOMINE HCL 20 MG
20 TABLET ORAL EVERY 6 HOURS PRN
Qty: 30 TABLET | Refills: 0 | Status: SHIPPED | OUTPATIENT
Start: 2023-02-13

## 2023-02-13 RX ORDER — MORPHINE SULFATE 4 MG/ML
4 INJECTION, SOLUTION INTRAMUSCULAR; INTRAVENOUS ONCE
Status: DISCONTINUED | OUTPATIENT
Start: 2023-02-13 | End: 2023-02-13

## 2023-02-13 RX ORDER — ONDANSETRON 2 MG/ML
4 INJECTION INTRAMUSCULAR; INTRAVENOUS ONCE
Status: DISCONTINUED | OUTPATIENT
Start: 2023-02-13 | End: 2023-02-13

## 2023-02-13 RX ORDER — POLYETHYLENE GLYCOL 3350 17 G/17G
17 POWDER, FOR SOLUTION ORAL DAILY
Qty: 119 G | Refills: 0 | Status: SHIPPED | OUTPATIENT
Start: 2023-02-13 | End: 2023-02-20

## 2023-02-13 RX ADMIN — SODIUM CHLORIDE 1000 ML: 0.9 INJECTION, SOLUTION INTRAVENOUS at 11:42

## 2023-02-13 RX ADMIN — IOHEXOL 100 ML: 350 INJECTION, SOLUTION INTRAVENOUS at 11:45

## 2023-02-13 NOTE — PATIENT INSTRUCTIONS
I recommend you go to the emergency room for imaging and lab work because of your complicated history

## 2023-02-13 NOTE — PROGRESS NOTES
[facility] [facility]        NAME: Connie Alarcon is a 44 y o  male  : 1985    MRN: [id number]  DATE: 2023  TIME: 9:58 AM    Assessment and Plan   LLQ pain [R10 32]  1  LLQ pain  Transfer to other facility      2  History of colonic diverticulitis  Transfer to other facility            Patient Instructions   Patient Instructions   I recommend you go to the emergency room for imaging and lab work because of your complicated history          Follow up with PCP in 3-5 days  Proceed to  ER if symptoms worsen  Chief Complaint     Chief Complaint   Patient presents with   • Abdominal Pain     Pt presents with LLQ abd pain started three days ago; increased pain with stretching, coughing; BM changes with onset         History of Present Illness       The patient is a 79-year-old male with a hx of a partial colectomy after diverticultis and 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 presenting today for worsening left lower quadrant abdominal pain x 3 days  Patient woke up Saturday morning and began coughing when he felt a "sharp stabbing" pain in his left lower quadrant that radiates to the left flank  He reports the pain worsening with stretching and coughing  He has no pain at rest but with movement the pain is a 10/10  He also admits to 3 days of constipation but denies any blood in the stool or dark tarry stool  He has a history of kidney stones  Patient denies any fever or chills  He denies any nausea, vomiting, urinary burning, or urinary frequency  He has not taken anything for the pain  He has not missed any doses of Xarelto  Review of Systems   Review of Systems   Constitutional: Negative for activity change, appetite change, chills, diaphoresis and fever  HENT: Negative for congestion, ear pain, rhinorrhea and sore throat  Eyes: Negative for pain and visual disturbance  Respiratory: Negative for cough, chest tightness and shortness of breath      Cardiovascular: Negative for chest pain and palpitations  Gastrointestinal: Positive for abdominal pain and constipation  Negative for diarrhea, nausea and vomiting  Genitourinary: Negative for dysuria, frequency, hematuria and urgency  Musculoskeletal: Negative for arthralgias, back pain and myalgias  Skin: Negative for color change, pallor and rash  Neurological: Negative for seizures, syncope and headaches  All other systems reviewed and are negative  Current Medications       Current Outpatient Medications:   •  rivaroxaban (XARELTO) 20 mg tablet, Take 1 tablet (20 mg total) by mouth daily with breakfast, Disp: 30 tablet, Rfl: 1  •  docusate sodium (COLACE) 100 mg capsule, Take 1 capsule (100 mg total) by mouth 2 (two) times a day (Patient not taking: Reported on 5/17/2021), Disp: 10 capsule, Rfl: 0  •  ferrous sulfate 324 (65 Fe) mg, Take 1 tablet (324 mg total) by mouth every other day (Patient not taking: Reported on 2/8/2022), Disp: 60 tablet, Rfl: 0    Current Allergies     Allergies as of 02/13/2023 - Reviewed 02/13/2023   Allergen Reaction Noted   • Codeine Hives 02/08/2022            The following portions of the patient's history were reviewed and updated as appropriate: allergies, current medications, past family history, past medical history, past social history, past surgical history and problem list      Past Medical History:   Diagnosis Date   • Deep vein thrombosis (DVT) (HCC)    • Diverticulitis    • 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       Past Surgical History:   Procedure Laterality Date   • COLON SURGERY     • FEMUR SURGERY     • IR DVT THROMBOLYSIS/THROMBECTOMY ILIAC/IVC WITH VENOGRAM  4/26/2021   • IR IVC FILTER REMOVAL  6/21/2021   • IR TPA LYSIS CHECK  4/27/2021   • KNEE SURGERY Bilateral    • SHOULDER SURGERY Left        Family History   Problem Relation Age of Onset   • Deep vein thrombosis Father          Medications have been verified          Objective   /94   Pulse 86   Temp 97 8 °F (36 6 °C)   Resp 16   Wt (!) 150 kg (330 lb)   SpO2 98%   BMI 44 76 kg/m²        Physical Exam     Physical Exam  Vitals and nursing note reviewed  Constitutional:       General: He is not in acute distress  Appearance: He is well-developed  He is obese  He is not ill-appearing  HENT:      Head: Normocephalic and atraumatic  Cardiovascular:      Rate and Rhythm: Normal rate and regular rhythm  Heart sounds: Normal heart sounds  No murmur heard  Pulmonary:      Effort: Pulmonary effort is normal  No respiratory distress  Breath sounds: Normal breath sounds  No wheezing, rhonchi or rales  Abdominal:      General: A surgical scar is present  Bowel sounds are normal       Tenderness: There is abdominal tenderness in the left lower quadrant  There is no right CVA tenderness or left CVA tenderness  Neurological:      General: No focal deficit present  Mental Status: He is alert and oriented to person, place, and time

## 2023-02-14 NOTE — ED PROVIDER NOTES
History  Chief Complaint   Patient presents with   • Abdominal Pain     Pt reports piercing LLQ pain X 3 days  Pt history of diverticulitis     26-year-old male with past history of diverticulitis status post partial colectomy, DVT, presents to the ED for evaluation of of left-sided abdominal pain over the past 4 days  Patient tried taking over-the-counter medication without control of the pain  Patient also notes some nausea and constipation  Patient denies any fevers or chills  Patient denies any diarrhea or increased urinary frequency  Patient came to the ED to rule out diverticulitis  History provided by:  Patient  Abdominal Pain  Associated symptoms: constipation and nausea    Associated symptoms: no chest pain, no cough, no diarrhea, no dysuria, no fatigue, no fever, no shortness of breath, no sore throat and no vomiting        Prior to Admission Medications   Prescriptions Last Dose Informant Patient Reported?  Taking?   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       Past Surgical History:   Procedure Laterality Date   • COLON SURGERY     • FEMUR SURGERY     • IR DVT THROMBOLYSIS/THROMBECTOMY ILIAC/IVC WITH VENOGRAM  4/26/2021   • IR IVC FILTER REMOVAL  6/21/2021   • IR TPA LYSIS CHECK  4/27/2021   • KNEE SURGERY Bilateral    • SHOULDER SURGERY Left        Family History   Problem Relation Age of Onset   • Deep vein thrombosis Father      I have reviewed and agree with the history as documented  E-Cigarette/Vaping   • E-Cigarette Use Former User      E-Cigarette/Vaping Substances   • Nicotine No    • THC No    • CBD No    • Flavoring No    • Other No    • Unknown No      Social History     Tobacco Use   • Smoking status: Some Days     Types: Cigarettes     Passive exposure: Never   • Smokeless tobacco: Never   Vaping Use   • Vaping Use: Former   Substance Use Topics   • Alcohol use: Yes     Comment: occas   • Drug use: Yes     Types: Marijuana     Comment: medical card       Review of Systems   Constitutional: Negative for activity change, fatigue and fever  HENT: Negative for congestion, ear discharge and sore throat  Eyes: Negative for pain and redness  Respiratory: Negative for cough, chest tightness, shortness of breath and wheezing  Cardiovascular: Negative for chest pain  Gastrointestinal: Positive for abdominal pain, constipation and nausea  Negative for diarrhea and vomiting  Endocrine: Negative for cold intolerance  Genitourinary: Negative for dysuria and urgency  Musculoskeletal: Negative for arthralgias and back pain  Neurological: Negative for dizziness, weakness and headaches  Psychiatric/Behavioral: Negative for agitation and behavioral problems  Physical Exam  Physical Exam  Vitals and nursing note reviewed  Constitutional:       Appearance: He is well-developed  HENT:      Head: Normocephalic and atraumatic  Nose: Nose normal    Eyes:      Conjunctiva/sclera: Conjunctivae normal    Cardiovascular:      Rate and Rhythm: Normal rate and regular rhythm  Heart sounds: Normal heart sounds  Pulmonary:      Effort: Pulmonary effort is normal       Breath sounds: Normal breath sounds  Abdominal:      General: Bowel sounds are normal  There is no distension  Palpations: Abdomen is soft  Tenderness: There is no abdominal tenderness        Comments: Abdominal soft, nondistended, with bowel sound present to all 4 quadrants  Left lower quadrant tenderness to palpation noted on exam    Musculoskeletal:         General: Normal range of motion  Cervical back: Normal range of motion and neck supple  Skin:     General: Skin is warm  Neurological:      General: No focal deficit present  Mental Status: He is alert and oriented to person, place, and time  Psychiatric:         Mood and Affect: Mood normal          Behavior: Behavior normal          Thought Content: Thought content normal          Judgment: Judgment normal          Vital Signs  ED Triage Vitals [02/13/23 1026]   Temperature Pulse Respirations Blood Pressure SpO2   98 2 °F (36 8 °C) 80 16 166/94 98 %      Temp Source Heart Rate Source Patient Position - Orthostatic VS BP Location FiO2 (%)   Temporal Monitor Sitting Right arm --      Pain Score       10 - Worst Possible Pain           Vitals:    02/13/23 1026   BP: 166/94   Pulse: 80   Patient Position - Orthostatic VS: Sitting         Visual Acuity      ED Medications  Medications   sodium chloride 0 9 % bolus 1,000 mL (0 mL Intravenous Stopped 2/13/23 1318)   iohexol (OMNIPAQUE) 350 MG/ML injection (SINGLE-DOSE) 100 mL (100 mL Intravenous Given 2/13/23 1145)       Diagnostic Studies  Results Reviewed     Procedure Component Value Units Date/Time    Rapid drug screen, urine [[id number]]  (Abnormal) Collected: 02/13/23 1239    Lab Status: Final result Specimen: Urine, Clean Catch Updated: 02/13/23 1306     Amph/Meth UR Negative     Barbiturate Ur Negative     Benzodiazepine Urine Negative     Cocaine Urine Negative     Methadone Urine Negative     Opiate Urine Negative     PCP Ur Negative     THC Urine Positive     Oxycodone Urine Negative    Narrative:      Presumptive report  If requested, specimen will be sent to reference lab for confirmation  FOR MEDICAL PURPOSES ONLY  IF CONFIRMATION NEEDED PLEASE CONTACT THE LAB WITHIN 5 DAYS      Drug Screen Cutoff Levels:  AMPHETAMINE/METHAMPHETAMINES  1000 ng/mL  BARBITURATES     200 ng/mL  BENZODIAZEPINES     200 ng/mL  COCAINE      300 ng/mL  METHADONE      300 ng/mL  OPIATES      300 ng/mL  PHENCYCLIDINE     25 ng/mL  THC       50 ng/mL  OXYCODONE      100 ng/mL    UA w Reflex to Microscopic w Reflex to Culture [[id number]] Collected: 02/13/23 1240    Lab Status: Final result Specimen: Urine, Clean Catch Updated: 02/13/23 1254     Color, UA Yellow     Clarity, UA Clear     Specific Gravity, UA 1 015     pH, UA 6 0     Leukocytes, UA Negative     Nitrite, UA Negative     Protein, UA Negative mg/dl      Glucose, UA Negative mg/dl      Ketones, UA Negative mg/dl      Urobilinogen, UA 0 2 E U /dl      Bilirubin, UA Negative     Occult Blood, UA Negative    FLU/RSV/COVID - if FLU/RSV clinically relevant [[id number]]  (Normal) Collected: 02/13/23 1141    Lab Status: Final result Specimen: Nares from Nose Updated: 02/13/23 1235     SARS-CoV-2 Negative     INFLUENZA A PCR Negative     INFLUENZA B PCR Negative     RSV PCR Negative    Narrative:      FOR PEDIATRIC PATIENTS - copy/paste COVID Guidelines URL to browser: [email or web address]  [employer]    SARS-CoV-2 assay is a Nucleic Acid Amplification assay intended for the  qualitative detection of nucleic acid from SARS-CoV-2 in nasopharyngeal  swabs  Results are for the presumptive identification of SARS-CoV-2 RNA  Positive results are indicative of infection with SARS-CoV-2, the virus  causing COVID-19, but do not rule out bacterial infection or co-infection  with other viruses  Laboratories within the [place] and its  territories are required to report all positive results to the appropriate  public health authorities  Negative results do not preclude SARS-CoV-2  infection and should not be used as the sole basis for treatment or other  patient management decisions   Negative results must be combined with  clinical observations, patient history, and epidemiological information  This test has not been FDA cleared or approved  This test has been authorized by FDA under an Emergency Use Authorization  (EUA)  This test is only authorized for the duration of time the  declaration that circumstances exist justifying the authorization of the  emergency use of an in vitro diagnostic tests for detection of SARS-CoV-2  virus and/or diagnosis of COVID-19 infection under section 564(b)(1) of  the Act, 21 U  S C  978VAV-7(I)(5), unless the authorization is terminated  or revoked sooner  The test has been validated but independent review by FDA  and CLIA is pending  Test performed using [employer] Gene[employer]pert: This RT-PCR assay targets N2,  a region unique to SARS-CoV-2  A conserved region in the E-gene was chosen  for pan-Sarbecovirus detection which includes SARS-CoV-2  According to CMS-2020-01-R, this platform meets the definition of high-throughput technology      Comprehensive metabolic panel [[id number]]  (Abnormal) Collected: 02/13/23 1141    Lab Status: Final result Specimen: Blood from Arm, Right Updated: 02/13/23 1214     Sodium 142 mmol/L      Potassium 4 5 mmol/L      Chloride 107 mmol/L      CO2 28 mmol/L      ANION GAP 7 mmol/L      BUN 12 mg/dL      Creatinine 1 15 mg/dL      Glucose 102 mg/dL      Calcium 8 7 mg/dL      AST 27 U/L      ALT 39 U/L      Alkaline Phosphatase 95 U/L      Total Protein 7 7 g/dL      Albumin 3 8 g/dL      Total Bilirubin 0 18 mg/dL      eGFR 80 ml/min/1 73sq m     Narrative:      Alex guidelines for Chronic Kidney Disease (CKD):   •  Stage 1 with normal or high GFR (GFR > 90 mL/min/1 73 square meters)  •  Stage 2 Mild CKD (GFR = 60-89 mL/min/1 73 square meters)  •  Stage 3A Moderate CKD (GFR = 45-59 mL/min/1 73 square meters)  •  Stage 3B Moderate CKD (GFR = 30-44 mL/min/1 73 square meters)  •  Stage 4 Severe CKD (GFR = 15-29 mL/min/1 73 square meters)  •  Stage 5 End Stage CKD (GFR <15 mL/min/1 73 square meters)  Note: GFR calculation is accurate only with a steady state creatinine    Lipase [[id number]]  (Abnormal) Collected: 02/13/23 1141    Lab Status: Final result Specimen: Blood from Arm, Right Updated: 02/13/23 1214     Lipase 54 u/L     Magnesium [[id number]]  (Normal) Collected: 02/13/23 1141    Lab Status: Final result Specimen: Blood from Arm, Right Updated: 02/13/23 1214     Magnesium 2 1 mg/dL     CBC and differential [[id number]]  (Abnormal) Collected: 02/13/23 1141    Lab Status: Final result Specimen: Blood from Arm, Right Updated: 02/13/23 1151     WBC 8 43 Thousand/uL      RBC 5 15 Million/uL      Hemoglobin 13 5 g/dL      Hematocrit 43 2 %      MCV 84 fL      MCH 26 2 pg      MCHC 31 3 g/dL      RDW 14 8 %      MPV 12 1 fL      Platelets 480 Thousands/uL      nRBC 0 /100 WBCs      Neutrophils Relative 64 %      Immat GRANS % 0 %      Lymphocytes Relative 28 %      Monocytes Relative 6 %      Eosinophils Relative 1 %      Basophils Relative 1 %      Neutrophils Absolute 5 38 Thousands/µL      Immature Grans Absolute 0 02 Thousand/uL      Lymphocytes Absolute 2 34 Thousands/µL      Monocytes Absolute 0 49 Thousand/µL      Eosinophils Absolute 0 12 Thousand/µL      Basophils Absolute 0 08 Thousands/µL                  CT abdomen pelvis with contrast   Final Result by Anthony Hyde MD (02/13 1301)      No acute CT findings in the abdomen or pelvis  Workstation performed: [id number]                    Procedures  Procedures         ED Course                               SBIRT 22yo+    Flowsheet Row Most Recent Value   SBIRT (23 yo +)    In order to provide better care to our patients, we are screening all of our patients for alcohol and drug use  Would it be okay to ask you these screening questions?  No Filed at: 02/13/2023 1208                    Medical Decision Making  Obtain blood work, UA, CT abdomen/pelvis  Of IV fluids, pain medication and continue to monitor patient for any worsening symptoms  Patient's lab work was essentially unremarkable  CT scan did not show any acute diverticulitis  Some stool was seen throughout the abdomen suggesting a degree of constipation  Patient also clinically feels constipated  At this time discussed high-fiber diet  Patient is discharged home on MiraLAX as well as Bentyl for abdominal pain  Patient will follow-up with PCP in 3 to 4 days  Close return instructions given to return to the ER for any worsening symptoms  Patient agrees with discharge plan  Patient well appearing at time of discharge  Please Note: Flu[employer] Direct voice recognition software may have been used in the creation of this document  Wrong words or sound a like substitutions may have occurred due to the inherent limitations of the voice software  Abdominal pain: complicated acute illness or injury  Constipation: complicated acute illness or injury  Amount and/or Complexity of Data Reviewed  Labs: ordered  Radiology: ordered  Risk  Prescription drug management  Disposition  Final diagnoses:   Abdominal pain   Constipation     Time reflects when diagnosis was documented in both MDM as applicable and the Disposition within this note     Time User Action Codes Description Comment    2/13/2023  1:12 PM Dot Chess Add [R10 9] Abdominal pain     2/13/2023  1:12 PM Dot Chess Add [K59 00] Constipation       ED Disposition     ED Disposition   Discharge    Condition   Stable    Date/Time   Mon Feb 13, 2023  1:12 PM    Comment   Jamie Inman discharge to home/self care                 Follow-up Information     Follow up With Specialties Details Why Contact Info    Chente Guerrero MD  In 1 week  [address] [facility]  [phone]            Discharge Medication List as of 2/13/2023  1:13 PM      START taking these medications    Details   dicyclomine (BENTYL) 20 mg tablet Take 1 tablet (20 mg total) by mouth every 6 (six) hours as needed (abd pain), Starting Mon 2/13/2023, Normal      polyethylene glycol (MIRALAX) 17 g packet Take 17 g by mouth daily for 7 days, Starting Mon 2/13/2023, Until Mon 2/20/2023, Normal         CONTINUE these medications which have NOT CHANGED    Details   docusate sodium (COLACE) 100 mg capsule Take 1 capsule (100 mg total) by mouth 2 (two) times a day, Starting Fri 4/30/2021, Normal      ferrous sulfate 324 (65 Fe) mg Take 1 tablet (324 mg total) by mouth every other day, Starting Fri 4/30/2021, Normal      rivaroxaban (XARELTO) 20 mg tablet Take 1 tablet (20 mg total) by mouth daily with breakfast, Starting Thu 5/20/2021, Normal             No discharge procedures on file      PDMP Review     None          ED Provider  Electronically Signed by           Munir Cerrato DO  02/13/23 2040

## 2023-05-11 ENCOUNTER — HOSPITAL ENCOUNTER (EMERGENCY)
Facility: HOSPITAL | Age: 38
Discharge: HOME | End: 2023-05-11
Attending: GENERAL PRACTICE | Admitting: GENERAL PRACTICE

## 2023-05-11 ENCOUNTER — APPOINTMENT (EMERGENCY)
Dept: RADIOLOGY | Facility: HOSPITAL | Age: 38
End: 2023-05-11

## 2023-05-11 ENCOUNTER — OFFICE VISIT (OUTPATIENT)
Dept: URGENT CARE | Facility: CLINIC | Age: 38
End: 2023-05-11

## 2023-05-11 VITALS
WEIGHT: 315 LBS | HEART RATE: 90 BPM | SYSTOLIC BLOOD PRESSURE: 162 MMHG | TEMPERATURE: 97.9 F | BODY MASS INDEX: 43.54 KG/M2 | OXYGEN SATURATION: 100 % | RESPIRATION RATE: 16 BRPM | DIASTOLIC BLOOD PRESSURE: 110 MMHG

## 2023-05-11 VITALS
RESPIRATION RATE: 26 BRPM | OXYGEN SATURATION: 96 % | TEMPERATURE: 98.2 F | SYSTOLIC BLOOD PRESSURE: 160 MMHG | DIASTOLIC BLOOD PRESSURE: 105 MMHG | HEART RATE: 87 BPM

## 2023-05-11 DIAGNOSIS — R03.0 ELEVATED BLOOD PRESSURE READING: ICD-10-CM

## 2023-05-11 DIAGNOSIS — R07.9 CHEST PAIN, UNSPECIFIED TYPE: Primary | ICD-10-CM

## 2023-05-11 DIAGNOSIS — R06.02 SOB (SHORTNESS OF BREATH): ICD-10-CM

## 2023-05-11 DIAGNOSIS — R07.9 CHEST PAIN, UNSPECIFIED: Primary | ICD-10-CM

## 2023-05-11 LAB
2HR DELTA HS TROPONIN: 0 NG/L
ANION GAP SERPL CALCULATED.3IONS-SCNC: 9 MMOL/L (ref 4–13)
BASOPHILS # BLD AUTO: 0.07 THOUSANDS/ÂΜL (ref 0–0.1)
BASOPHILS NFR BLD AUTO: 1 % (ref 0–1)
BUN SERPL-MCNC: 14 MG/DL (ref 5–25)
CALCIUM SERPL-MCNC: 10.3 MG/DL (ref 8.4–10.2)
CARDIAC TROPONIN I PNL SERPL HS: 5 NG/L
CARDIAC TROPONIN I PNL SERPL HS: 5 NG/L
CHLORIDE SERPL-SCNC: 101 MMOL/L (ref 96–108)
CO2 SERPL-SCNC: 26 MMOL/L (ref 21–32)
CREAT SERPL-MCNC: 1.18 MG/DL (ref 0.6–1.3)
EOSINOPHIL # BLD AUTO: 0.03 THOUSAND/ÂΜL (ref 0–0.61)
EOSINOPHIL NFR BLD AUTO: 0 % (ref 0–6)
ERYTHROCYTE [DISTWIDTH] IN BLOOD BY AUTOMATED COUNT: 14.7 % (ref 11.6–15.1)
GFR SERPL CREATININE-BSD FRML MDRD: 77 ML/MIN/1.73SQ M
GLUCOSE SERPL-MCNC: 107 MG/DL (ref 65–140)
HCT VFR BLD AUTO: 47.4 % (ref 36.5–49.3)
HGB BLD-MCNC: 15 G/DL (ref 12–17)
IMM GRANULOCYTES # BLD AUTO: 0.03 THOUSAND/UL (ref 0–0.2)
IMM GRANULOCYTES NFR BLD AUTO: 0 % (ref 0–2)
LYMPHOCYTES # BLD AUTO: 2 THOUSANDS/ÂΜL (ref 0.6–4.47)
LYMPHOCYTES NFR BLD AUTO: 20 % (ref 14–44)
MCH RBC QN AUTO: 26.9 PG (ref 26.8–34.3)
MCHC RBC AUTO-ENTMCNC: 31.6 G/DL (ref 31.4–37.4)
MCV RBC AUTO: 85 FL (ref 82–98)
MONOCYTES # BLD AUTO: 0.63 THOUSAND/ÂΜL (ref 0.17–1.22)
MONOCYTES NFR BLD AUTO: 6 % (ref 4–12)
NEUTROPHILS # BLD AUTO: 7.17 THOUSANDS/ÂΜL (ref 1.85–7.62)
NEUTS SEG NFR BLD AUTO: 73 % (ref 43–75)
NRBC BLD AUTO-RTO: 0 /100 WBCS
PLATELET # BLD AUTO: 233 THOUSANDS/UL (ref 149–390)
PMV BLD AUTO: 12.1 FL (ref 8.9–12.7)
POTASSIUM SERPL-SCNC: 4.2 MMOL/L (ref 3.5–5.3)
RBC # BLD AUTO: 5.58 MILLION/UL (ref 3.88–5.62)
SODIUM SERPL-SCNC: 136 MMOL/L (ref 135–147)
WBC # BLD AUTO: 9.93 THOUSAND/UL (ref 4.31–10.16)

## 2023-05-11 RX ORDER — SODIUM CHLORIDE 9 MG/ML
3 INJECTION INTRAVENOUS
Status: DISCONTINUED | OUTPATIENT
Start: 2023-05-11 | End: 2023-05-11 | Stop reason: HOSPADM

## 2023-05-11 RX ORDER — IBUPROFEN 600 MG/1
600 TABLET ORAL EVERY 6 HOURS PRN
Qty: 30 TABLET | Refills: 0 | Status: SHIPPED | OUTPATIENT
Start: 2023-05-11

## 2023-05-11 RX ORDER — KETOROLAC TROMETHAMINE 30 MG/ML
15 INJECTION, SOLUTION INTRAMUSCULAR; INTRAVENOUS ONCE
Status: COMPLETED | OUTPATIENT
Start: 2023-05-11 | End: 2023-05-11

## 2023-05-11 RX ADMIN — IOHEXOL 100 ML: 350 INJECTION, SOLUTION INTRAVENOUS at 13:52

## 2023-05-11 RX ADMIN — KETOROLAC TROMETHAMINE 15 MG: 30 INJECTION, SOLUTION INTRAMUSCULAR at 15:32

## 2023-05-11 NOTE — PROGRESS NOTES
[address] Now        NAME: Marek Uriostegui is a 44 y o  male  : 1985    MRN: [id number]  DATE: May 11, 2023  TIME: 11:47 AM    Assessment and Plan   Chest pain, unspecified type [R07 9]  1  Chest pain, unspecified type  Transfer to other facility      2  SOB (shortness of breath)  Transfer to other facility      3  Elevated blood pressure reading  Transfer to other facility        EKG with NSR 80 bpm  Inverted T waves in II, aVF, V4-V6 and ST elevation in V2 only  Recommend ED evaluation to r/o DVT/PE  Pt agreeable and will drive self to [facility] ED now  Patient Instructions       Follow up with PCP in 3-5 days  Proceed to  ER if symptoms worsen  Chief Complaint     Chief Complaint   Patient presents with   • Chest Pain     Pt states he woke up in middle of night with chest pain, states did not feel right         History of Present Illness       Pt is a 43 yo male with pmh DVT, iliocaval venous thrombosis on Xarelto, diverticulitis with h/o partial colectomy presenting with chest tightness and sob x 8 hours  Woke him up from sleep at [address] [place] [address] Ave back to sleep  Currently rated as 7/10 in severity, mid-lower sternum without radiation  No numbness/tingling in arm  Feels the SOB is slightly better since waking up but still not normal  Has not taken any otc meds for his symptoms  Also noticed a few days ago that his compression socks were harder to get on his R leg compared to his left but no pain or erythema  Does admit to being pretty sedentary as he works from home, and also flew to [place] 3 weeks ago  Was seen in ER a few months ago for chest pain but was diagnosed with anxiety  Father and paternal uncle both have h/o MIs in their 45s  No n/v/d, abd pain, wheezing, cough, URI symptoms  Had tacos with hot sauce for dinner last night  Review of Systems   Review of Systems   Constitutional: Negative for appetite change, chills, diaphoresis, fatigue and fever     HENT: Negative for congestion and sore throat  Respiratory: Positive for chest tightness and shortness of breath  Negative for cough and wheezing  Cardiovascular: Positive for leg swelling  Negative for palpitations  Gastrointestinal: Negative for abdominal pain, nausea and vomiting  Musculoskeletal: Negative for back pain and neck pain  Skin: Negative for color change  Neurological: Negative for dizziness, weakness, light-headedness and numbness  Psychiatric/Behavioral: The patient is not nervous/anxious  Current Medications       Current Outpatient Medications:   •  rivaroxaban (Xarelto) 20 mg tablet, Take 20 mg by mouth, Disp: , Rfl:   •  ferrous sulfate 324 (65 Fe) mg, Take 1 tablet (324 mg total) by mouth every other day (Patient not taking: Reported on 5/11/2023), Disp: 60 tablet, Rfl: 0    Current Allergies     Allergies as of 05/11/2023 - Reviewed 05/11/2023   Allergen Reaction Noted   • Codeine Hives 02/08/2022            The following portions of the patient's history were reviewed and updated as appropriate: allergies, current medications, past family history, past medical history, past social history, past surgical history and problem list      Past Medical History:   Diagnosis Date   • Deep vein thrombosis (DVT) ([facility] [address] )    • Diverticulitis    • 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       Past Surgical History:   Procedure Laterality Date   • COLON SURGERY     • FEMUR SURGERY     • IR DVT THROMBOLYSIS/THROMBECTOMY ILIAC/IVC WITH VENOGRAM  4/26/2021   • IR IVC FILTER REMOVAL  6/21/2021   • IR TPA LYSIS CHECK  4/27/2021   • KNEE SURGERY Bilateral    • SHOULDER SURGERY Left        Family History   Problem Relation Age of Onset   • Deep vein thrombosis Father          Medications have been verified  Objective   BP (!) 162/110   Pulse 90   Temp 97 9 °F (36 6 °C)   Resp 16   Wt (!) 146 kg (321 lb)   SpO2 100%   BMI 43 54 kg/m²        Physical Exam     Physical Exam  Vitals and nursing note reviewed  Constitutional:       General: He is not in acute distress  Appearance: He is well-developed  He is obese  He is not ill-appearing, toxic-appearing or diaphoretic  HENT:      Head: Normocephalic and atraumatic  Cardiovascular:      Rate and Rhythm: Normal rate and regular rhythm  Heart sounds: Normal heart sounds  Pulmonary:      Effort: Pulmonary effort is normal       Breath sounds: Normal breath sounds  No decreased breath sounds, wheezing, rhonchi or rales  Chest:      Chest wall: No tenderness  Abdominal:      General: Bowel sounds are normal       Palpations: Abdomen is soft  Musculoskeletal:         General: Normal range of motion  Right lower leg: No tenderness  Left lower leg: No tenderness  Skin:     General: Skin is warm and dry  Capillary Refill: Capillary refill takes less than 2 seconds  Neurological:      General: No focal deficit present  Mental Status: He is alert

## 2023-05-11 NOTE — ED PROVIDER NOTES
History  Chief Complaint   Patient presents with   • Chest Pain     Pt reports dull centralized chest tightness for past 10 hours, does not radiate, reports it comes with slight shortness of breath  Patient is a 42yo M with PMHx DVTs on Xarelto (since 2010) who presents to the ED with chest tightness associated with shorntess of breath  Symptoms started last night when he was going to bed  Non-exertional  Woke him from sleep several times  Symptoms persisted this morning which prompted him to go to urgent care for evaluation  Urgent care noted T wave inversions on EKG and sent him to the ED  Currently asymptomatic  No fevers or chills  He has been compliant on his xarelto  Recent travel home from vacation  Chest Pain  Associated symptoms: no abdominal pain, no cough, no fatigue, no headache, no nausea, no palpitations and not vomiting        Prior to Admission Medications   Prescriptions Last Dose Informant Patient Reported? Taking?   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       Past Surgical History:   Procedure Laterality Date   • COLON SURGERY     • FEMUR SURGERY     • IR DVT THROMBOLYSIS/THROMBECTOMY ILIAC/IVC WITH VENOGRAM  4/26/2021   • IR IVC FILTER REMOVAL  6/21/2021   • IR TPA LYSIS CHECK  4/27/2021   • KNEE SURGERY Bilateral    • SHOULDER SURGERY Left        Family History   Problem Relation Age of Onset   • Deep vein thrombosis Father      I have reviewed and agree with the history as documented      E-Cigarette/Vaping   • E-Cigarette Use Former User      E-Cigarette/Vaping Substances   • Nicotine No    • THC No    • CBD No    • Flavoring No    • Other No    • Unknown No      Social History     Tobacco Use   • Smoking status: Some Days     Types: Cigarettes     Passive exposure: Never   • Smokeless tobacco: Never   Vaping Use   • Vaping Use: Former   Substance Use Topics   • Alcohol use: Yes     Comment: occas   • Drug use: Yes     Types: Marijuana     Comment: medical card       Review of Systems   Constitutional: Negative for chills and fatigue  HENT: Negative for congestion and rhinorrhea  Eyes: Negative for redness and visual disturbance  Respiratory: Negative for cough and wheezing  Cardiovascular: Positive for chest pain  Negative for palpitations  Gastrointestinal: Negative for abdominal pain, constipation, diarrhea, nausea and vomiting  Endocrine: Negative for polydipsia and polyuria  Genitourinary: Negative for dysuria and hematuria  Musculoskeletal: Negative for arthralgias and myalgias  Neurological: Negative for light-headedness and headaches  Hematological: Negative for adenopathy  Does not bruise/bleed easily  Psychiatric/Behavioral: Negative for dysphoric mood  The patient is not nervous/anxious  All other systems reviewed and are negative  Physical Exam  Physical Exam  Constitutional:       General: He is not in acute distress  Appearance: Normal appearance  He is not ill-appearing  HENT:      Head: Normocephalic and atraumatic  Mouth/Throat:      Mouth: Mucous membranes are moist       Pharynx: Oropharynx is clear  Eyes:      General: No scleral icterus  Conjunctiva/sclera: Conjunctivae normal    Cardiovascular:      Rate and Rhythm: Normal rate and regular rhythm  Pulses: Normal pulses  Heart sounds: No murmur heard  No friction rub  No gallop  Pulmonary:      Effort: Pulmonary effort is normal  No respiratory distress  Breath sounds: Normal breath sounds  No wheezing, rhonchi or rales  Abdominal:      Palpations: Abdomen is soft  Tenderness:  There is no abdominal tenderness  Musculoskeletal:         General: No swelling or tenderness  Normal range of motion  Cervical back: Normal range of motion and neck supple  Skin:     General: Skin is warm and dry  Capillary Refill: Capillary refill takes less than 2 seconds  Neurological:      General: No focal deficit present  Mental Status: He is alert and oriented to person, place, and time  Mental status is at baseline     Psychiatric:         Mood and Affect: Mood normal          Behavior: Behavior normal          Vital Signs  ED Triage Vitals   Temperature Pulse Respirations Blood Pressure SpO2   05/11/23 1213 05/11/23 1205 05/11/23 1205 05/11/23 1205 05/11/23 1205   98 2 °F (36 8 °C) 97 18 (!) 179/104 98 %      Temp src Heart Rate Source Patient Position - Orthostatic VS BP Location FiO2 (%)   -- 05/11/23 1300 05/11/23 1300 05/11/23 1205 --    Monitor Sitting Right arm       Pain Score       05/11/23 1402       7           Vitals:    05/11/23 1400 05/11/23 1402 05/11/23 1445 05/11/23 1500   BP: (!) 173/88 (!) 173/88 165/98 (!) 160/105   Pulse: 88 87 79 87   Patient Position - Orthostatic VS: Sitting Sitting Sitting Sitting         Visual Acuity      ED Medications  Medications   sodium chloride (PF) 0 9 % injection 3 mL (has no administration in time range)   iohexol (OMNIPAQUE) 350 MG/ML injection (SINGLE-DOSE) 100 mL (100 mL Intravenous Given 5/11/23 1352)   ketorolac (TORADOL) injection 15 mg (15 mg Intravenous Given 5/11/23 1532)       Diagnostic Studies  Results Reviewed     Procedure Component Value Units Date/Time    HS Troponin I 2hr [[id number]]  (Normal) Collected: 05/11/23 1445    Lab Status: Final result Specimen: Blood from Arm, Right Updated: 05/11/23 1518     hs TnI 2hr 5 ng/L      Delta 2hr hsTnI 0 ng/L     HS Troponin I 4hr [[id number]]     Lab Status: No result Specimen: Blood     HS Troponin 0hr (reflex protocol) [[id number]]  (Normal) Collected: 05/11/23 1255    Lab Status: Final result Specimen: Blood from Arm, Right Updated: 05/11/23 1325     hs TnI 0hr 5 ng/L     Basic metabolic panel [[id number]]  (Abnormal) Collected: 05/11/23 1255    Lab Status: Final result Specimen: Blood from Arm, Right Updated: 05/11/23 1317     Sodium 136 mmol/L      Potassium 4 2 mmol/L      Chloride 101 mmol/L      CO2 26 mmol/L      ANION GAP 9 mmol/L      BUN 14 mg/dL      Creatinine 1 18 mg/dL      Glucose 107 mg/dL      Calcium 10 3 mg/dL      eGFR 77 ml/min/1 73sq m     Narrative:      [facility] guidelines for Chronic Kidney Disease (CKD):   •  Stage 1 with normal or high GFR (GFR > 90 mL/min/1 73 square meters)  •  Stage 2 Mild CKD (GFR = 60-89 mL/min/1 73 square meters)  •  Stage 3A Moderate CKD (GFR = 45-59 mL/min/1 73 square meters)  •  Stage 3B Moderate CKD (GFR = 30-44 mL/min/1 73 square meters)  •  Stage 4 Severe CKD (GFR = 15-29 mL/min/1 73 square meters)  •  Stage 5 End Stage CKD (GFR <15 mL/min/1 73 square meters)  Note: GFR calculation is accurate only with a steady state creatinine    CBC and differential [[id number]] Collected: 05/11/23 1255    Lab Status: Final result Specimen: Blood from Arm, Right Updated: 05/11/23 1302     WBC 9 93 Thousand/uL      RBC 5 58 Million/uL      Hemoglobin 15 0 g/dL      Hematocrit 47 4 %      MCV 85 fL      MCH 26 9 pg      MCHC 31 6 g/dL      RDW 14 7 %      MPV 12 1 fL      Platelets 370 Thousands/uL      nRBC 0 /100 WBCs      Neutrophils Relative 73 %      Immat GRANS % 0 %      Lymphocytes Relative 20 %      Monocytes Relative 6 %      Eosinophils Relative 0 %      Basophils Relative 1 %      Neutrophils Absolute 7 17 Thousands/µL      Immature Grans Absolute 0 03 Thousand/uL      Lymphocytes Absolute 2 00 Thousands/µL      Monocytes Absolute 0 63 Thousand/µL      Eosinophils Absolute 0 03 Thousand/µL      Basophils Absolute 0 07 Thousands/µL                  CTA ED chest PE Study   Final Result by Law Durán DO (05/11 1514)   No central pulmonary emboli  No right heart strain  Workstation performed: [id number]         VAS lower limb venous duplex study, complete bilateral    (Results Pending)              Procedures  ECG 12 Lead Documentation Only    Date/Time: 5/11/2023 1:28 PM  Performed by: Marcella Taylor MD  Authorized by: Marcella Taylor MD     Indications / Diagnosis:  Chest pain  Patient location:  ED  Previous ECG:     Previous ECG:  Unavailable  Interpretation:     Interpretation: abnormal    Rate:     ECG rate:  83    ECG rate assessment: normal    Rhythm:     Rhythm: sinus rhythm    Ectopy:     Ectopy: none    QRS:     QRS axis:  Normal  Conduction:     Conduction: normal    ST segments:     ST segments:  Abnormal    Elevation:  V1 and V2  T waves:     T waves: inverted    Q waves:     Q waves:  II, III, aVF and V6             ED Course  ED Course as of 05/11/23 1536   Thu May 11, 2023   1526 Patient re-evaluated  Results discussed  Still with some tightness in his chest  Will give toradol 15mg IV now  SBIRT 22yo+    Flowsheet Row Most Recent Value   Initial Alcohol Screen: US AUDIT-C     1  How often do you have a drink containing alcohol? 4 Filed at: 05/11/2023 1214   Audit-C Score 4 Filed at: 05/11/2023 1214   GRACIE: How many times in the past year have you    Used an illegal drug or used a prescription medication for non-medical reasons? Never Filed at: 05/11/2023 1214                    MDM    Disposition  Final diagnoses:   Chest pain, unspecified     Time reflects when diagnosis was documented in both MDM as applicable and the Disposition within this note     Time User Action Codes Description Comment    5/11/2023  3:32 PM Murphy Webb Add [R07 9] Chest pain, unspecified       ED Disposition     ED Disposition   Discharge    Condition   Stable    Date/Time   Thu May 11, 2023  3:32 PM    Comment   Enrrique Ruiz discharge to home/self care                 Follow-up Information     Follow up With Specialties Details Why Contact Info    Iris Kline MD  Schedule an appointment as soon as possible for a visit   [address] [place] [address]  [phone]            Patient's Medications   Discharge Prescriptions    IBUPROFEN (MOTRIN) 600 MG TABLET    Take 1 tablet (600 mg total) by mouth every 6 (six) hours as needed for moderate pain or mild pain       Start Date: 5/11/2023 End Date: --       Order Dose: 600 mg       Quantity: 30 tablet    Refills: 0       No discharge procedures on file      PDMP Review     None          ED Provider  Electronically Signed by           Kishan Calle MD  05/11/23 5519

## 2023-05-11 NOTE — Clinical Note
Marek Uriostegui was seen and treated in our emergency department on 5/11/2023  No restrictions            Diagnosis: Chest Pain    Regis Lopes  may return to work on return date  He may return on this date: 05/12/2023         If you have any questions or concerns, please don't hesitate to call        Trish Granger RN    ______________________________           _______________          _______________  Hospital Representative                              Date                                Time

## 2023-05-12 LAB
ATRIAL RATE: 70 BPM
ATRIAL RATE: 80 BPM
ATRIAL RATE: 83 BPM
P AXIS: 19 DEGREES
P AXIS: 36 DEGREES
P AXIS: 39 DEGREES
PR INTERVAL: 130 MS
PR INTERVAL: 140 MS
PR INTERVAL: 142 MS
QRS AXIS: 15 DEGREES
QRS AXIS: 5 DEGREES
QRS AXIS: 9 DEGREES
QRSD INTERVAL: 80 MS
QRSD INTERVAL: 90 MS
QRSD INTERVAL: 90 MS
QT INTERVAL: 354 MS
QT INTERVAL: 378 MS
QT INTERVAL: 396 MS
QTC INTERVAL: 415 MS
QTC INTERVAL: 427 MS
QTC INTERVAL: 435 MS
T WAVE AXIS: -15 DEGREES
T WAVE AXIS: -25 DEGREES
T WAVE AXIS: -37 DEGREES
VENTRICULAR RATE: 70 BPM
VENTRICULAR RATE: 80 BPM
VENTRICULAR RATE: 83 BPM

## 2024-01-13 ENCOUNTER — HOSPITAL ENCOUNTER (EMERGENCY)
Facility: HOSPITAL | Age: 39
Discharge: HOME | End: 2024-01-14
Attending: EMERGENCY MEDICINE
Payer: COMMERCIAL

## 2024-01-13 ENCOUNTER — APPOINTMENT (EMERGENCY)
Dept: RADIOLOGY | Facility: HOSPITAL | Age: 39
End: 2024-01-13
Payer: COMMERCIAL

## 2024-01-13 VITALS
TEMPERATURE: 100.5 F | SYSTOLIC BLOOD PRESSURE: 145 MMHG | DIASTOLIC BLOOD PRESSURE: 88 MMHG | BODY MASS INDEX: 42.66 KG/M2 | OXYGEN SATURATION: 96 % | RESPIRATION RATE: 18 BRPM | WEIGHT: 315 LBS | HEART RATE: 98 BPM | HEIGHT: 72 IN

## 2024-01-13 DIAGNOSIS — H66.90 OTITIS MEDIA: ICD-10-CM

## 2024-01-13 DIAGNOSIS — R68.89 FLU-LIKE SYMPTOMS: Primary | ICD-10-CM

## 2024-01-13 LAB
FLUAV RNA RESP QL NAA+PROBE: NEGATIVE
FLUBV RNA RESP QL NAA+PROBE: NEGATIVE
RSV RNA RESP QL NAA+PROBE: NEGATIVE
SARS-COV-2 RNA RESP QL NAA+PROBE: NEGATIVE

## 2024-01-13 PROCEDURE — 71045 X-RAY EXAM CHEST 1 VIEW: CPT

## 2024-01-13 PROCEDURE — 99284 EMERGENCY DEPT VISIT MOD MDM: CPT | Performed by: EMERGENCY MEDICINE

## 2024-01-13 PROCEDURE — 0241U HB NFCT DS VIR RESP RNA 4 TRGT: CPT | Performed by: EMERGENCY MEDICINE

## 2024-01-13 PROCEDURE — 99283 EMERGENCY DEPT VISIT LOW MDM: CPT

## 2024-01-13 RX ORDER — AMOXICILLIN AND CLAVULANATE POTASSIUM 875; 125 MG/1; MG/1
1 TABLET, FILM COATED ORAL EVERY 12 HOURS SCHEDULED
Qty: 14 TABLET | Refills: 0 | Status: SHIPPED | OUTPATIENT
Start: 2024-01-13 | End: 2024-01-20

## 2024-01-13 RX ORDER — AMOXICILLIN AND CLAVULANATE POTASSIUM 875; 125 MG/1; MG/1
1 TABLET, FILM COATED ORAL ONCE
Status: COMPLETED | OUTPATIENT
Start: 2024-01-14 | End: 2024-01-13

## 2024-01-13 RX ORDER — IBUPROFEN 600 MG/1
600 TABLET ORAL ONCE
Status: DISCONTINUED | OUTPATIENT
Start: 2024-01-13 | End: 2024-01-13

## 2024-01-13 RX ORDER — ACETAMINOPHEN 325 MG/1
650 TABLET ORAL ONCE
Status: DISCONTINUED | OUTPATIENT
Start: 2024-01-13 | End: 2024-01-13

## 2024-01-13 RX ADMIN — AMOXICILLIN AND CLAVULANATE POTASSIUM 1 TABLET: 875; 125 TABLET, FILM COATED ORAL at 23:59

## 2024-01-13 NOTE — Clinical Note
Heber Singh was seen and treated in our emergency department on 1/13/2024.                Diagnosis:     Heber  may return to work on return date.    He may return on this date: 01/23/2024         If you have any questions or concerns, please don't hesitate to call.      Erin Weems MD    ______________________________           _______________          _______________  Hospital Representative                              Date                                Time

## 2024-01-14 NOTE — DISCHARGE INSTRUCTIONS
Your chest xray and viral studies were normal at this time.  Take the antibiotic as prescribed.  Continue tylenol every 6 hours as needed for fever.  The fever will go away as the infection gets better.  Rest and keep hydrated with fluids.  Return to ER if severe worsening or breathing distress.

## 2024-01-14 NOTE — ED PROVIDER NOTES
History  Chief Complaint   Patient presents with    Flu Symptoms     Pt reports child was sick with flu. Pt reports that he started with symptoms on January 1st. Pt reports taking medication for flu on January 4th. Pt c/o symptoms returning today. Pt reports generalized weakness, left ear pain, chills, fatigue, fever, and lower right back pain.      39 yo male with h/o DVT on Xarelto c/o waking up this morning with cough, congestion, left ear pain and fever all day.  Has been taking tylenol.  No vomiting or diarrhea.  + right mid back pain.  He was also sick 2 weeks ago when his son had the flu.      History provided by:  Patient   used: No    Flu Symptoms  Presenting symptoms: cough and fever    Presenting symptoms: no diarrhea, no sore throat and no vomiting    Associated symptoms: chills, ear pain and nasal congestion        Prior to Admission Medications   Prescriptions Last Dose Informant Patient Reported? Taking?   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    E-Cigarette/Vaping    E-Cigarette Use Former User      E-Cigarette/Vaping Substances    Nicotine No     THC No     CBD No     Flavoring No     Other No     Unknown No      Social History     Tobacco Use    Smoking status: Some Days     Types: Cigarettes     Passive exposure: Never    Smokeless tobacco: Never   Vaping Use    Vaping status: Former   Substance Use Topics    Alcohol use: Yes     Comment: occas    Drug use: Yes     Types: Marijuana     Comment: medical card       Review of Systems   Constitutional:  Positive for chills and fever.   HENT:  Positive for congestion and ear pain. Negative for sore throat.    Respiratory:  Positive for cough.    Gastrointestinal:  Negative for diarrhea and vomiting.   Musculoskeletal:  Positive for back pain.       Physical Exam  Physical Exam  Vitals and nursing note reviewed.   Constitutional:       General: He is not in acute distress.     Appearance: He is well-developed. He is not ill-appearing or diaphoretic.   HENT:      Head: Normocephalic and atraumatic.      Right Ear: Tympanic membrane and ear canal normal.      Ears:      Comments: Left TM dull and slightly red     Mouth/Throat:      Mouth: Mucous membranes are moist.      Pharynx: Oropharynx is clear. No oropharyngeal exudate or posterior oropharyngeal erythema.   Eyes:      General: No scleral icterus.     Conjunctiva/sclera: Conjunctivae normal.   Cardiovascular:      Rate and Rhythm: Normal rate and regular rhythm.      Heart sounds: Normal heart sounds. No murmur heard.  Pulmonary:      Effort: Pulmonary effort is normal. No respiratory distress.      Breath sounds: Normal breath sounds.   Abdominal:      General: Bowel sounds are normal. There is no distension.      Palpations: Abdomen is soft.      Tenderness: There is no abdominal tenderness.   Musculoskeletal:         General: No deformity. Normal range of motion.      Cervical back:  Normal range of motion and neck supple.      Right lower leg: No edema.      Left lower leg: No edema.   Skin:     General: Skin is warm and dry.      Coloration: Skin is not pale.      Findings: No erythema or rash.   Neurological:      General: No focal deficit present.      Mental Status: He is alert and oriented to person, place, and time.      Cranial Nerves: No cranial nerve deficit.   Psychiatric:         Mood and Affect: Mood normal.         Behavior: Behavior normal.         Vital Signs  ED Triage Vitals [01/13/24 2233]   Temperature Pulse Respirations Blood Pressure SpO2   100.5 °F (38.1 °C) 98 18 145/88 96 %      Temp Source Heart Rate Source Patient Position - Orthostatic VS BP Location FiO2 (%)   Oral Monitor -- Right arm --      Pain Score       7           Vitals:    01/13/24 2233   BP: 145/88   Pulse: 98         Visual Acuity      ED Medications  Medications   amoxicillin-clavulanate (AUGMENTIN) 875-125 mg per tablet 1 tablet (has no administration in time range)       Diagnostic Studies  Results Reviewed       Procedure Component Value Units Date/Time    FLU/RSV/COVID - if FLU/RSV clinically relevant [[id number]]  (Normal) Collected: 01/13/24 2252    Lab Status: Final result Specimen: Nares from Nose Updated: 01/13/24 2337     SARS-CoV-2 Negative     INFLUENZA A PCR Negative     INFLUENZA B PCR Negative     RSV PCR Negative    Narrative:      FOR PEDIATRIC PATIENTS - copy/paste COVID Guidelines URL to browser: [email or web address]    SARS-CoV-2 assay is a Nucleic Acid Amplification assay intended for the  qualitative detection of nucleic acid from SARS-CoV-2 in nasopharyngeal  swabs. Results are for the presumptive identification of SARS-CoV-2 RNA.    Positive results are indicative of infection with SARS-CoV-2, the virus  causing COVID-19, but do not rule out bacterial infection or co-infection  with other viruses. Laboratories within the [facility] and its  territories are required to report all positive results to the appropriate  public health authorities. Negative results do not preclude SARS-CoV-2  infection and should not be used as the sole basis for treatment or other  patient management decisions. Negative results must be combined with  clinical observations, patient history, and epidemiological information.  This test has not been FDA cleared or approved.    This test has been authorized by FDA under an Emergency Use Authorization  (EUA). This test is only authorized for the duration of time the  declaration that circumstances exist justifying the authorization of the  emergency use of an in vitro diagnostic tests for detection of SARS-CoV-2  virus and/or diagnosis of COVID-19 infection under section 564(b)(1) of  the Act, 21 U.S.C. 360bbb-3(b)(1), unless the authorization is terminated  or revoked sooner. The test has been validated but independent review by FDA  and CLIA is pending.    Test performed using [employer] Gene[employer]pert: This RT-PCR assay targets N2,  a region unique to SARS-CoV-2. A conserved region in the E-gene was chosen  for pan-Sarbecovirus detection which includes SARS-CoV-2.    According to CMS-2020-01-R, this platform meets the definition of high-throughput technology.                   XR chest 1 view portable   ED Interpretation by Erin Weems MD (01/13 2333)   CM, NAD                 Procedures  Procedures         ED Course                               SBIRT 20yo+      Flowsheet Row Most Recent Value   Initial Alcohol Screen: US AUDIT-C     1. How often do you have a drink containing alcohol? 0 Filed at: 01/13/2024 2237   2. How many drinks containing alcohol do you have on a typical day you are drinking?  0 Filed at: 01/13/2024 2237   3a. Male UNDER 65: How often do you have five or more drinks on one occasion? 0 Filed at: 01/13/2024 2237   3b. FEMALE Any Age, or MALE 65+: How often do you have 4 or more drinks on one  occassion? 0 Filed at: 01/13/2024 2237   Audit-C Score 0 Filed at: 01/13/2024 2237   GRACIE: How many times in the past year have you...    Used an illegal drug or used a prescription medication for non-medical reasons? Never Filed at: 01/13/2024 2237                      Medical Decision Making  Prior records reviewed.  Pt. Has had several visits for chest pain and abdominal pain over the past year and had multiple abdominal and chest CTA scans which have been negative.    2356 - viral studies negative, no definite infiltrate seen on xray.  Will treat with abx for left OM.  Advised rest, fluids, tylenol, symptomatic tx.    Amount and/or Complexity of Data Reviewed  Radiology: ordered and independent interpretation performed.    Risk  Prescription drug management.             Disposition  Final diagnoses:   Flu-like symptoms   Otitis media     Time reflects when diagnosis was documented in both MDM as applicable and the Disposition within this note       Time User Action Codes Description Comment    1/13/2024 11:53 PM Erin Weems Add [R68.89] Flu-like symptoms     1/13/2024 11:54 PM Erin Weems Add [H66.90] Otitis media           ED Disposition       ED Disposition   Discharge    Condition   Stable    Date/Time   Sat Jan 13, 2024 3012    Comment   Heber Francisco discharge to home/self care.                   Follow-up Information       Follow up With Specialties Details Why Contact Info    Lexa Greenwood MD   As needed 2 [facility]  [address] [place] [id number]  [phone]              Patient's Medications   Discharge Prescriptions    AMOXICILLIN-CLAVULANATE (AUGMENTIN) 875-125 MG PER TABLET    Take 1 tablet by mouth every 12 (twelve) hours for 7 days       Start Date: 1/13/2024 End Date: 1/20/2024       Order Dose: 1 tablet       Quantity: 14 tablet    Refills: 0       No discharge procedures on file.    PDMP Review       None            ED Provider  Electronically Signed by             Erin  MD Faustina  01/13/24 4282

## 2024-03-23 ENCOUNTER — HOSPITAL ENCOUNTER (OUTPATIENT)
Dept: RADIOLOGY | Facility: HOSPITAL | Age: 39
Discharge: HOME | End: 2024-03-23
Payer: COMMERCIAL

## 2024-03-23 DIAGNOSIS — M79.605 PAIN IN LEFT LEG: ICD-10-CM

## 2024-03-23 PROCEDURE — 73564 X-RAY EXAM KNEE 4 OR MORE: CPT

## 2024-03-29 ENCOUNTER — HOSPITAL ENCOUNTER (OUTPATIENT)
Dept: RADIOLOGY | Facility: HOSPITAL | Age: 39
Discharge: HOME | End: 2024-03-29
Payer: COMMERCIAL

## 2024-03-29 DIAGNOSIS — M79.605 PAIN IN LEFT LEG: ICD-10-CM

## 2024-03-29 PROCEDURE — 93971 EXTREMITY STUDY: CPT

## 2024-03-29 PROCEDURE — 93971 EXTREMITY STUDY: CPT | Performed by: SURGERY

## 2024-07-12 ENCOUNTER — HOSPITAL ENCOUNTER (EMERGENCY)
Facility: HOSPITAL | Age: 39
Discharge: HOME | End: 2024-07-12
Attending: EMERGENCY MEDICINE
Payer: COMMERCIAL

## 2024-07-12 VITALS
OXYGEN SATURATION: 98 % | RESPIRATION RATE: 18 BRPM | SYSTOLIC BLOOD PRESSURE: 165 MMHG | TEMPERATURE: 98 F | WEIGHT: 315 LBS | HEART RATE: 84 BPM | BODY MASS INDEX: 43.54 KG/M2 | DIASTOLIC BLOOD PRESSURE: 107 MMHG
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
## 2024-07-12 DIAGNOSIS — L03.90 CELLULITIS: Primary | ICD-10-CM

## 2024-07-12 PROCEDURE — 99283 EMERGENCY DEPT VISIT LOW MDM: CPT

## 2024-07-12 PROCEDURE — 99284 EMERGENCY DEPT VISIT MOD MDM: CPT | Performed by: EMERGENCY MEDICINE

## 2024-07-12 PROCEDURE — 87070 CULTURE OTHR SPECIMN AEROBIC: CPT | Performed by: EMERGENCY MEDICINE

## 2024-07-12 PROCEDURE — 87186 SC STD MICRODIL/AGAR DIL: CPT | Performed by: EMERGENCY MEDICINE

## 2024-07-12 PROCEDURE — 87147 CULTURE TYPE IMMUNOLOGIC: CPT | Performed by: EMERGENCY MEDICINE

## 2024-07-12 PROCEDURE — 87205 SMEAR GRAM STAIN: CPT | Performed by: EMERGENCY MEDICINE

## 2024-07-12 RX ORDER — CEPHALEXIN 500 MG/1
500 CAPSULE ORAL ONCE
Status: COMPLETED | OUTPATIENT
Start: 2024-07-12 | End: 2024-07-12

## 2024-07-12 RX ORDER — CEPHALEXIN 500 MG/1
500 CAPSULE ORAL EVERY 6 HOURS SCHEDULED
Qty: 28 CAPSULE | Refills: 0 | Status: SHIPPED | OUTPATIENT
Start: 2024-07-12 | End: 2024-07-19

## 2024-07-12 RX ADMIN — CEPHALEXIN 500 MG: 500 CAPSULE ORAL at 09:57

## 2024-07-12 NOTE — DISCHARGE INSTRUCTIONS
Return to the ER for further concerns or worsening symptoms  Follow up with your primary care physician in 1-2 days  You have a pending wound culture and will be notified if your antibiotic needs to be adjusted  Take medication as prescribed

## 2024-07-12 NOTE — ED PROVIDER NOTES
History  Chief Complaint   Patient presents with    Leg Swelling    Insect Bite     Pt.  States 1 week ago   got a   bug bite  was itching it now remains open oozing and swollen with redness. Has hx of DVT in past and concerned about swelling as well as infection     HPI    Prior to Admission Medications   Prescriptions Last Dose Informant Patient Reported? Taking?   METOPROLOL-HYDROCHLOROTHIAZIDE PO 7/12/2024 Spouse/Significant Other Yes Yes   Sig: Take 1 tablet by mouth daily 20 mg   ferrous sulfate 324 (65 Fe) mg Not Taking Self No No   Sig: Take 1 tablet (324 mg total) by mouth every other day   Patient not taking: Reported on 7/12/2024   ibuprofen (MOTRIN) 600 mg tablet Not Taking  No No   Sig: Take 1 tablet (600 mg total) by mouth every 6 (six) hours as needed for moderate pain or mild pain   Patient not taking: Reported on 7/12/2024   rivaroxaban (XARELTO) 20 mg tablet 7/12/2024 Self Yes Yes   Sig: Take 20 mg by mouth      Facility-Administered Medications: None       Past Medical History:   Diagnosis Date    Deep vein thrombosis (DVT) (HCC)     Diverticulitis     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    E-Cigarette/Vaping    E-Cigarette Use Former User      E-Cigarette/Vaping Substances    Nicotine No     THC No     CBD No     Flavoring No     Other No     Unknown No      Social History     Tobacco Use    Smoking status: Some Days     Types: Cigarettes     Passive exposure: Never    Smokeless tobacco: Never   Vaping Use    Vaping status: Former   Substance Use Topics    Alcohol  use: Yes     Comment: occas    Drug use: Yes     Types: Marijuana     Comment: medical card       Review of Systems    Physical Exam  Physical Exam    Vital Signs  ED Triage Vitals [07/12/24 0910]   Temperature Pulse Respirations Blood Pressure SpO2   98 °F (36.7 °C) 84 18 (!) 165/107 98 %      Temp Source Heart Rate Source Patient Position - Orthostatic VS BP Location FiO2 (%)   Oral Monitor -- -- --      Pain Score       10 - Worst Possible Pain           Vitals:    07/12/24 0910   BP: (!) 165/107   Pulse: 84         Visual Acuity      ED Medications  Medications   cephalexin (KEFLEX) capsule 500 mg (has no administration in time range)       Diagnostic Studies  Results Reviewed       Procedure Component Value Units Date/Time    Wound culture and Gram stain [[id number]]     Lab Status: No result Specimen: Wound                    No orders to display              Procedures  Procedures         ED Course                                 SBIRT 20yo+      Flowsheet Row Most Recent Value   Initial Alcohol Screen: US AUDIT-C     1. How often do you have a drink containing alcohol? 0 Filed at: 07/12/2024 0913   2. How many drinks containing alcohol do you have on a typical day you are drinking?  0 Filed at: 07/12/2024 0913   3a. Male UNDER 65: How often do you have five or more drinks on one occasion? 0 Filed at: 07/12/2024 0913   3b. FEMALE Any Age, or MALE 65+: How often do you have 4 or more drinks on one occassion? 0 Filed at: 07/12/2024 0913   Audit-C Score 0 Filed at: 07/12/2024 0913   GRACIE: How many times in the past year have you...    Used an illegal drug or used a prescription medication for non-medical reasons? Never Filed at: 07/12/2024 0913                      Medical Decision Making  Amount and/or Complexity of Data Reviewed  Labs: ordered.    Risk  Prescription drug management.                 Disposition  Final diagnoses:   Cellulitis   Insect bite     Time reflects when diagnosis was documented in both  MDM as applicable and the Disposition within this note       Time User Action Codes Description Comment    7/12/2024  9:52 AM OyesanmiDariusubusulises O Add [L03.90] Cellulitis     7/12/2024  9:52 AM OyesanmiDariusubusulises O Add [W57.XXXA] Insect bite           ED Disposition       ED Disposition   Discharge    Condition   Stable    Date/Time   Fri Jul 12, 2024 0952    Comment   Heber Singh discharge to home/self care.                   Follow-up Information       Follow up With Specialties Details Why Contact Info    Lexa Greenwood MD  Schedule an appointment as soon as possible for a visit in 2 days for follow up 2 [address] [address]  [phone]              Patient's Medications   Discharge Prescriptions    CEPHALEXIN (KEFLEX) 500 MG CAPSULE    Take 1 capsule (500 mg total) by mouth every 6 (six) hours for 7 days       Start Date: 7/12/2024 End Date: 7/19/2024       Order Dose: 500 mg       Quantity: 28 capsule    Refills: 0       No discharge procedures on file.    PDMP Review       None            ED Provider  Electronically Signed by           Final                                      No orders to display              Procedures  Procedures         ED Course                                 SBIRT 22yo+      Flowsheet Row Most Recent Value   Initial Alcohol Screen: US AUDIT-C     1. How often do you have a drink containing alcohol? 0 Filed at: 07/12/2024 0913   2. How many drinks containing alcohol do you have on a typical day you are drinking?  0 Filed at: 07/12/2024 0913   3a. Male UNDER 65: How often do you have five or more drinks on one occasion? 0 Filed at: 07/12/2024 0913   3b. FEMALE Any Age, or MALE 65+: How often do you have 4 or more drinks on one occassion? 0 Filed at: 07/12/2024 0913   Audit-C Score 0 Filed at: 07/12/2024 0913   GRACIE: How many times in the past year have you...    Used an illegal drug or used a prescription medication for non-medical reasons? Never Filed at: 07/12/2024 0913                      Medical Decision Making  38-year-old male in ED with right lower extremity cellulitis after an insect bite.  Patient with no active drainage from the affected area, wound culture obtained to the best of my ability.  Will start patient on Keflex with recommendation for outpatient follow-up with primary care physician.    7/15 -I noticed wound culture was positive for MRSA.  Patient notified and prescription for Bactrim sent to the pharmacy.    Amount and/or Complexity of Data Reviewed  Labs: ordered.    Risk  Prescription drug management.                 Disposition  Final diagnoses:   Cellulitis   Insect bite     Time reflects when diagnosis was documented in both MDM as applicable and the Disposition within this note       Time User Action Codes Description Comment    7/12/2024  9:52 AM Sulma Denney Add [L03.90] Cellulitis     7/12/2024  9:52 AM Sulma Denney Add [W57.XXXA] Insect bite           ED Disposition       ED Disposition   Discharge    Condition   Stable    Date/Time   Fri Jul 12, 2024 0952    Comment    Heber Singh discharge to home/self care.                   Follow-up Information       Follow up With Specialties Details Why Contact Info    Lexa Greenwood MD  Schedule an appointment as soon as possible for a visit in 2 days for follow up 2 [facility]  [address]  [facility], [place] [address]  [phone]              Discharge Medication List as of 7/12/2024  9:54 AM        START taking these medications    Details   cephalexin (KEFLEX) 500 mg capsule Take 1 capsule (500 mg total) by mouth every 6 (six) hours for 7 days, Starting Fri 7/12/2024, Until Fri 7/19/2024, Normal           CONTINUE these medications which have NOT CHANGED    Details   METOPROLOL-HYDROCHLOROTHIAZIDE PO Take 1 tablet by mouth daily 20 mg, Historical Med      rivaroxaban (XARELTO) 20 mg tablet Take 20 mg by mouth, Historical Med      ferrous sulfate 324 (65 Fe) mg Take 1 tablet (324 mg total) by mouth every other day, Starting Fri 4/30/2021, Normal      ibuprofen (MOTRIN) 600 mg tablet Take 1 tablet (600 mg total) by mouth every 6 (six) hours as needed for moderate pain or mild pain, Starting u 5/11/2023, Normal             No discharge procedures on file.    PDMP Review       None            ED Provider  Electronically Signed by             Sulma Denney DO  07/15/24 2792

## 2024-07-14 LAB
BACTERIA WND AEROBE CULT: ABNORMAL
BACTERIA WND AEROBE CULT: ABNORMAL
GRAM STN SPEC: ABNORMAL

## 2024-07-15 RX ORDER — SULFAMETHOXAZOLE AND TRIMETHOPRIM 800; 160 MG/1; MG/1
1 TABLET ORAL 2 TIMES DAILY
Qty: 20 TABLET | Refills: 0 | Status: SHIPPED | OUTPATIENT
Start: 2024-07-15 | End: 2024-07-25

## 2025-02-11 NOTE — ED NOTES
Mariajose made aware.   PATIENT RESTING QUITELY AT THIS TIME, REPORTS PAIN IN LEFT GROIN AND LEFT LEG, MED ADMINSTERED, SEE MAR  RESPIRATIONS EVEN AND UNLABORED  MOM PRESENT AT BEDSIDE  LEFT LEG WRAPPED IN ACE WRAP PER DR NATACHA Cabello, RN  04/24/21 4956

## 2025-04-23 ENCOUNTER — HOSPITAL ENCOUNTER (EMERGENCY)
Facility: HOSPITAL | Age: 40
Discharge: HOME | End: 2025-04-23
Payer: COMMERCIAL

## 2025-04-23 VITALS
HEART RATE: 87 BPM | TEMPERATURE: 97.8 F | BODY MASS INDEX: 43.47 KG/M2 | SYSTOLIC BLOOD PRESSURE: 132 MMHG | DIASTOLIC BLOOD PRESSURE: 71 MMHG | WEIGHT: 315 LBS | RESPIRATION RATE: 20 BRPM | OXYGEN SATURATION: 98 %

## 2025-04-23 DIAGNOSIS — I83.899 BLEEDING FROM VARICOSE VEIN: Primary | ICD-10-CM

## 2025-04-23 PROCEDURE — 99282 EMERGENCY DEPT VISIT SF MDM: CPT

## 2025-04-23 PROCEDURE — 99284 EMERGENCY DEPT VISIT MOD MDM: CPT

## 2025-04-23 RX ORDER — METOPROLOL SUCCINATE 25 MG/1
25 TABLET, EXTENDED RELEASE ORAL DAILY
COMMUNITY

## 2025-04-23 RX ORDER — GINSENG 100 MG
1 CAPSULE ORAL ONCE
Status: COMPLETED | OUTPATIENT
Start: 2025-04-23 | End: 2025-04-23

## 2025-04-23 RX ADMIN — BACITRACIN ZINC 1 SMALL APPLICATION: 500 OINTMENT TOPICAL at 08:42

## 2025-04-23 NOTE — ED PROVIDER NOTES
Time reflects when diagnosis was documented in both MDM as applicable and the Disposition within this note       Time User Action Codes Description Comment    4/23/2025  8:32 AM Johnathan Barrera Add [I83.899] Bleeding from varicose vein           ED Disposition       ED Disposition   Discharge    Condition   Stable    Date/Time   Wed Apr 23, 2025  8:32 AM    Comment   Heber Singh discharge to home/self care.                   Assessment & Plan       Medical Decision Making  Risk  OTC drugs.      Patient is a 39-year-old male with past medical history of DVT on Eliquis presents via EMS from home for bleeding.  Patient states he scratched what he thought was a scab on his left leg that he now believes was a varicose vein that started bleeding profusely at home.  Patient called EMS for further evaluation because he is on a blood thinner.  On arrival patient is in no acute distress with no significant complaints.  Vital signs are stable.  Left leg with a small defect in a superficial varicose vein that is not actively bleeding.  There is no laceration or area that is amenable to repair.  Given the bleeding has already stopped, suggested the patient applying some bacitracin and a compression dressing over the area.  Patient to avoid scratching the area.  Patient stable for discharge.         Medications   bacitracin topical ointment 1 small application (1 small application Topical Given 4/23/25 0842)       ED Risk Strat Scores                    No data recorded                            History of Present Illness       Chief Complaint   Patient presents with    Bleeding/Bruising     Pt was scratching his leg the small area in the l shin started bleeding. 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      Past Surgical History:   Procedure Laterality Date    COLON SURGERY      FEMUR SURGERY      IR DVT  THROMBOLYSIS/THROMBECTOMY ILIAC/IVC WITH VENOGRAM  4/26/2021    IR IVC FILTER REMOVAL  6/21/2021    IR TPA LYSIS CHECK  4/27/2021    KNEE SURGERY Bilateral     SHOULDER SURGERY Left       Family History   Problem Relation Age of Onset    Deep vein thrombosis Father       Social History     Tobacco Use    Smoking status: Some Days     Types: Cigarettes     Passive exposure: Never    Smokeless tobacco: Never   Vaping Use    Vaping status: Former   Substance Use Topics    Alcohol use: Yes     Comment: occas    Drug use: Yes     Types: Marijuana     Comment: medical card      E-Cigarette/Vaping    E-Cigarette Use Former User       E-Cigarette/Vaping Substances    Nicotine No     THC No     CBD No     Flavoring No     Other No     Unknown No       I have reviewed and agree with the history as documented.     HPI  See MDM  Review of Systems   Constitutional:  Negative for chills and fever.   HENT:  Negative for ear pain and sore throat.    Eyes:  Negative for pain and visual disturbance.   Respiratory:  Negative for cough and shortness of breath.    Cardiovascular:  Negative for chest pain and palpitations.   Gastrointestinal:  Negative for abdominal pain and vomiting.   Genitourinary:  Negative for dysuria and hematuria.   Musculoskeletal:  Negative for arthralgias and back pain.   Skin:  Negative for color change and rash.   Neurological:  Negative for seizures and syncope.   All other systems reviewed and are negative.          Objective       ED Triage Vitals [04/23/25 0812]   Temperature Pulse Blood Pressure Respirations SpO2 Patient Position - Orthostatic VS   97.8 °F (36.6 °C) 87 132/71 20 98 % Sitting      Temp Source Heart Rate Source BP Location FiO2 (%) Pain Score    Tympanic Monitor Left arm -- --      Vitals      Date and Time Temp Pulse SpO2 Resp BP Pain Score FACES Pain Rating User   04/23/25 0812 97.8 °F (36.6 °C) 87 98 % 20 132/71 -- -- RASHIDA            Physical Exam  Vitals and nursing note reviewed.    Constitutional:       General: He is not in acute distress.     Appearance: He is well-developed. He is not ill-appearing.   HENT:      Head: Normocephalic and atraumatic.      Right Ear: External ear normal.      Left Ear: External ear normal.   Eyes:      Extraocular Movements: Extraocular movements intact.   Cardiovascular:      Rate and Rhythm: Normal rate and regular rhythm.      Pulses: Normal pulses.   Pulmonary:      Effort: Pulmonary effort is normal. No respiratory distress.   Abdominal:      General: Abdomen is flat. There is no distension.   Musculoskeletal:         General: No swelling.      Cervical back: Neck supple.      Right lower leg: Edema present.      Left lower leg: Edema present.   Skin:     General: Skin is warm and dry.      Capillary Refill: Capillary refill takes less than 2 seconds.      Comments: Left lower anterior leg with superficial varicose vein with a very small approximately 1 mm circular superficial defect with no active bleeding.  Area is otherwise nontender and there are no lacerations to repair.   Neurological:      Mental Status: He is alert.   Psychiatric:         Mood and Affect: Mood normal.         Results Reviewed       None            No orders to display       Procedures    ED Medication and Procedure Management   Prior to Admission Medications   Prescriptions Last Dose Informant Patient Reported? Taking?   ferrous sulfate 324 (65 Fe) mg Unknown Self No No   Sig: Take 1 tablet (324 mg total) by mouth every other day   Patient not taking: Reported on 7/12/2024   ibuprofen (MOTRIN) 600 mg tablet Unknown  No No   Sig: Take 1 tablet (600 mg total) by mouth every 6 (six) hours as needed for moderate pain or mild pain   Patient not taking: Reported on 7/12/2024   metoprolol succinate (TOPROL-XL) 25 mg 24 hr tablet 4/23/2025 Morning Self Yes Yes   Sig: Take 25 mg by mouth daily   rivaroxaban (XARELTO) 20 mg tablet 4/23/2025 Morning Self Yes Yes   Sig: Take 20 mg by mouth       Facility-Administered Medications: None     Discharge Medication List as of 4/23/2025  8:32 AM        CONTINUE these medications which have NOT CHANGED    Details   metoprolol succinate (TOPROL-XL) 25 mg 24 hr tablet Take 25 mg by mouth daily, Historical Med      rivaroxaban (XARELTO) 20 mg tablet Take 20 mg by mouth, Historical Med      ferrous sulfate 324 (65 Fe) mg Take 1 tablet (324 mg total) by mouth every other day, Starting Fri 4/30/2021, Normal      ibuprofen (MOTRIN) 600 mg tablet Take 1 tablet (600 mg total) by mouth every 6 (six) hours as needed for moderate pain or mild pain, Starting Thu 5/11/2023, Normal           No discharge procedures on file.  ED SEPSIS DOCUMENTATION   Time reflects when diagnosis was documented in both MDM as applicable and the Disposition within this note       Time User Action Codes Description Comment    4/23/2025  8:32 AM Johnathan Barrera Add [I83.899] Bleeding from varicose vein                  Johnathan Barrera MD  04/23/25 0085

## 2025-04-23 NOTE — DISCHARGE INSTRUCTIONS
Keep left leg clean, dry, avoid scratching.     If you develop new or worsening symptoms, please return to the Emergency Department for further evaluation.